# Patient Record
Sex: FEMALE | Race: WHITE | NOT HISPANIC OR LATINO | Employment: OTHER | URBAN - METROPOLITAN AREA
[De-identification: names, ages, dates, MRNs, and addresses within clinical notes are randomized per-mention and may not be internally consistent; named-entity substitution may affect disease eponyms.]

---

## 2018-02-28 ENCOUNTER — HISTORICAL (OUTPATIENT)
Dept: ADMINISTRATIVE | Facility: HOSPITAL | Age: 73
End: 2018-02-28

## 2018-02-28 ENCOUNTER — HISTORICAL (OUTPATIENT)
Dept: LAB | Facility: HOSPITAL | Age: 73
End: 2018-02-28

## 2018-02-28 LAB
ABS NEUT (OLG): 5.66 X10(3)/MCL (ref 2.1–9.2)
ALBUMIN SERPL-MCNC: 4.3 GM/DL (ref 3.4–5)
ALBUMIN/GLOB SERPL: 1.3 RATIO (ref 1.1–2)
ALP SERPL-CCNC: 64 UNIT/L (ref 38–126)
ALT SERPL-CCNC: 18 UNIT/L (ref 12–78)
APPEARANCE, UA: CLEAR
APTT PPP: 28.9 SECOND(S) (ref 24.8–36.9)
AST SERPL-CCNC: 13 UNIT/L (ref 15–37)
BACTERIA SPEC CULT: ABNORMAL /HPF
BASOPHILS # BLD AUTO: 0.1 X10(3)/MCL (ref 0–0.2)
BASOPHILS NFR BLD AUTO: 1 %
BILIRUB SERPL-MCNC: 0.6 MG/DL (ref 0.2–1)
BILIRUB UR QL STRIP: NEGATIVE
BILIRUBIN DIRECT+TOT PNL SERPL-MCNC: 0.2 MG/DL (ref 0–0.5)
BILIRUBIN DIRECT+TOT PNL SERPL-MCNC: 0.4 MG/DL (ref 0–0.8)
BUN SERPL-MCNC: 9 MG/DL (ref 7–18)
CALCIUM SERPL-MCNC: 9.6 MG/DL (ref 8.5–10.1)
CHLORIDE SERPL-SCNC: 98 MMOL/L (ref 98–107)
CO2 SERPL-SCNC: 33 MMOL/L (ref 21–32)
COLOR UR: YELLOW
CREAT SERPL-MCNC: 0.58 MG/DL (ref 0.55–1.02)
EOSINOPHIL # BLD AUTO: 0.2 X10(3)/MCL (ref 0–0.9)
EOSINOPHIL NFR BLD AUTO: 2 %
ERYTHROCYTE [DISTWIDTH] IN BLOOD BY AUTOMATED COUNT: 12.2 % (ref 11.5–17)
GLOBULIN SER-MCNC: 3.3 GM/DL (ref 2.4–3.5)
GLUCOSE (UA): NEGATIVE
GLUCOSE SERPL-MCNC: 92 MG/DL (ref 74–106)
HCT VFR BLD AUTO: 41.8 % (ref 37–47)
HGB BLD-MCNC: 14.2 GM/DL (ref 12–16)
HGB UR QL STRIP: NEGATIVE
INR PPP: 0.91 (ref 0–1.27)
KETONES UR QL STRIP: NEGATIVE
LEUKOCYTE ESTERASE UR QL STRIP: NEGATIVE
LYMPHOCYTES # BLD AUTO: 2.1 X10(3)/MCL (ref 0.6–4.6)
LYMPHOCYTES NFR BLD AUTO: 23 %
MCH RBC QN AUTO: 32.3 PG (ref 27–31)
MCHC RBC AUTO-ENTMCNC: 34 GM/DL (ref 33–36)
MCV RBC AUTO: 95.2 FL (ref 80–94)
MONOCYTES # BLD AUTO: 1.2 X10(3)/MCL (ref 0.1–1.3)
MONOCYTES NFR BLD AUTO: 13 %
MUCOUS THREADS URNS QL MICRO: ABNORMAL
NEUTROPHILS # BLD AUTO: 5.66 X10(3)/MCL (ref 1.4–7.9)
NEUTROPHILS NFR BLD AUTO: 61 %
NITRITE UR QL STRIP: NEGATIVE
PH UR STRIP: 6.5 [PH] (ref 5–9)
PLATELET # BLD AUTO: 321 X10(3)/MCL (ref 130–400)
PMV BLD AUTO: 8.4 FL (ref 9.4–12.4)
POTASSIUM SERPL-SCNC: 3.8 MMOL/L (ref 3.5–5.1)
PROT SERPL-MCNC: 7.6 GM/DL (ref 6.4–8.2)
PROT UR QL STRIP: NEGATIVE
PROTHROMBIN TIME: 12.5 SECOND(S) (ref 12.2–14.7)
RBC # BLD AUTO: 4.39 X10(6)/MCL (ref 4.2–5.4)
RBC #/AREA URNS HPF: ABNORMAL /[HPF]
SODIUM SERPL-SCNC: 136 MMOL/L (ref 136–145)
SP GR UR STRIP: 1.02 (ref 1–1.03)
SQUAMOUS EPITHELIAL, UA: ABNORMAL
TRANSFERRIN SERPL-MCNC: 264 MG/DL (ref 200–360)
UA WBC MAN: ABNORMAL
UROBILINOGEN UR STRIP-ACNC: 0.2
WBC # SPEC AUTO: 9.3 X10(3)/MCL (ref 4.5–11.5)

## 2018-03-02 LAB — FINAL CULTURE: NORMAL

## 2018-04-10 ENCOUNTER — HISTORICAL (OUTPATIENT)
Dept: ADMINISTRATIVE | Facility: HOSPITAL | Age: 73
End: 2018-04-10

## 2019-01-02 ENCOUNTER — HISTORICAL (OUTPATIENT)
Dept: ADMINISTRATIVE | Facility: HOSPITAL | Age: 74
End: 2019-01-02

## 2019-01-02 ENCOUNTER — HISTORICAL (OUTPATIENT)
Dept: LAB | Facility: HOSPITAL | Age: 74
End: 2019-01-02

## 2019-01-02 LAB
ABS NEUT (OLG): 5.38 X10(3)/MCL (ref 2.1–9.2)
BASOPHILS # BLD AUTO: 0.06 X10(3)/MCL (ref 0–0.2)
BASOPHILS NFR BLD AUTO: 0.7 % (ref 0–1)
CRP SERPL-MCNC: <0.3 MG/DL
EOSINOPHIL # BLD AUTO: 0.22 X10(3)/MCL (ref 0–0.9)
EOSINOPHIL NFR BLD AUTO: 2.5 % (ref 0–6.4)
ERYTHROCYTE [DISTWIDTH] IN BLOOD BY AUTOMATED COUNT: 12.4 % (ref 11.5–17)
ERYTHROCYTE [SEDIMENTATION RATE] IN BLOOD: 8 MM/HR (ref 0–30)
HCT VFR BLD AUTO: 40.2 % (ref 37–47)
HGB BLD-MCNC: 13.5 GM/DL (ref 12–16)
IMM GRANULOCYTES # BLD AUTO: 0.03 10*3/UL (ref 0–0.02)
IMM GRANULOCYTES NFR BLD AUTO: 0.3 % (ref 0–0.43)
LYMPHOCYTES # BLD AUTO: 2.05 X10(3)/MCL (ref 0.6–4.6)
LYMPHOCYTES NFR BLD AUTO: 23.4 % (ref 16–44)
MCH RBC QN AUTO: 31.4 PG (ref 27–31)
MCHC RBC AUTO-ENTMCNC: 33.6 GM/DL (ref 33–36)
MCV RBC AUTO: 93.5 FL (ref 80–94)
MONOCYTES # BLD AUTO: 1.01 X10(3)/MCL (ref 0.1–1.3)
MONOCYTES NFR BLD AUTO: 11.5 % (ref 4–12.1)
NEUTROPHILS # BLD AUTO: 5.38 X10(3)/MCL (ref 2.1–9.2)
NEUTROPHILS NFR BLD AUTO: 61.6 % (ref 43–73)
NRBC BLD AUTO-RTO: 0 % (ref 0–0.2)
PLATELET # BLD AUTO: 260 X10(3)/MCL (ref 130–400)
PMV BLD AUTO: 9 FL (ref 7.4–10.4)
RBC # BLD AUTO: 4.3 X10(6)/MCL (ref 4.2–5.4)
WBC # SPEC AUTO: 8.8 X10(3)/MCL (ref 4.5–11.5)

## 2019-01-15 ENCOUNTER — HISTORICAL (OUTPATIENT)
Dept: RADIOLOGY | Facility: HOSPITAL | Age: 74
End: 2019-01-15

## 2019-10-08 ENCOUNTER — HISTORICAL (OUTPATIENT)
Dept: ADMINISTRATIVE | Facility: HOSPITAL | Age: 74
End: 2019-10-08

## 2020-10-23 ENCOUNTER — HISTORICAL (OUTPATIENT)
Dept: ADMINISTRATIVE | Facility: HOSPITAL | Age: 75
End: 2020-10-23

## 2020-10-26 ENCOUNTER — HISTORICAL (OUTPATIENT)
Dept: ADMINISTRATIVE | Facility: HOSPITAL | Age: 75
End: 2020-10-26

## 2020-12-08 ENCOUNTER — HISTORICAL (OUTPATIENT)
Dept: ADMINISTRATIVE | Facility: HOSPITAL | Age: 75
End: 2020-12-08

## 2021-08-12 ENCOUNTER — HISTORICAL (OUTPATIENT)
Dept: ADMINISTRATIVE | Facility: HOSPITAL | Age: 76
End: 2021-08-12

## 2022-04-11 ENCOUNTER — HISTORICAL (OUTPATIENT)
Dept: ADMINISTRATIVE | Facility: HOSPITAL | Age: 77
End: 2022-04-11

## 2022-04-25 VITALS
DIASTOLIC BLOOD PRESSURE: 74 MMHG | WEIGHT: 185.19 LBS | OXYGEN SATURATION: 98 % | BODY MASS INDEX: 32.81 KG/M2 | HEIGHT: 63 IN | SYSTOLIC BLOOD PRESSURE: 136 MMHG

## 2022-06-13 PROBLEM — I20.9 ANGINA PECTORIS, UNSPECIFIED: Status: ACTIVE | Noted: 2022-06-13

## 2022-06-13 PROBLEM — R53.83 FATIGUE: Status: ACTIVE | Noted: 2022-06-13

## 2022-06-13 PROBLEM — F41.9 ANXIETY: Status: ACTIVE | Noted: 2022-06-13

## 2022-06-13 PROCEDURE — 82607 VITAMIN B-12: CPT | Performed by: NURSE PRACTITIONER

## 2022-06-14 PROBLEM — R19.7 DIARRHEA: Status: ACTIVE | Noted: 2022-06-14

## 2022-06-15 PROBLEM — K92.1 HEMATOCHEZIA: Status: ACTIVE | Noted: 2022-06-15

## 2022-07-12 PROBLEM — R41.3 MEMORY CHANGES: Status: ACTIVE | Noted: 2022-07-12

## 2022-07-12 PROBLEM — G45.9 TIA (TRANSIENT ISCHEMIC ATTACK): Status: ACTIVE | Noted: 2022-07-12

## 2022-08-11 PROBLEM — S81.012A LACERATION OF LEFT KNEE: Status: ACTIVE | Noted: 2022-08-11

## 2022-08-11 PROBLEM — S81.012A LACERATION OF LEFT KNEE: Status: RESOLVED | Noted: 2022-08-11 | Resolved: 2022-08-11

## 2022-08-23 PROBLEM — I51.9 HEART DISEASE: Status: ACTIVE | Noted: 2022-08-23

## 2022-08-23 PROBLEM — M17.11 OSTEOARTHRITIS OF RIGHT KNEE: Status: ACTIVE | Noted: 2022-08-23

## 2022-08-23 PROBLEM — I10 PRIMARY HYPERTENSION: Status: ACTIVE | Noted: 2022-08-23

## 2022-08-23 PROBLEM — M48.061 SPINAL STENOSIS OF LUMBAR REGION: Status: ACTIVE | Noted: 2022-08-23

## 2022-08-23 PROBLEM — M17.12 OSTEOARTHRITIS OF LEFT KNEE: Status: ACTIVE | Noted: 2022-08-23

## 2022-08-23 PROBLEM — E66.9 OBESITY: Status: ACTIVE | Noted: 2022-08-23

## 2022-08-24 ENCOUNTER — OFFICE VISIT (OUTPATIENT)
Dept: URGENT CARE | Facility: CLINIC | Age: 77
End: 2022-08-24
Payer: MEDICARE

## 2022-08-24 ENCOUNTER — TELEPHONE (OUTPATIENT)
Dept: URGENT CARE | Facility: CLINIC | Age: 77
End: 2022-08-24

## 2022-08-24 VITALS
BODY MASS INDEX: 33.86 KG/M2 | HEART RATE: 60 BPM | WEIGHT: 184 LBS | SYSTOLIC BLOOD PRESSURE: 167 MMHG | OXYGEN SATURATION: 97 % | TEMPERATURE: 99 F | RESPIRATION RATE: 18 BRPM | DIASTOLIC BLOOD PRESSURE: 77 MMHG | HEIGHT: 62 IN

## 2022-08-24 DIAGNOSIS — M25.531 RIGHT WRIST PAIN: ICD-10-CM

## 2022-08-24 DIAGNOSIS — S52.124A CLOSED NONDISPLACED FRACTURE OF HEAD OF RIGHT RADIUS, INITIAL ENCOUNTER: Primary | ICD-10-CM

## 2022-08-24 PROCEDURE — 99213 PR OFFICE/OUTPT VISIT, EST, LEVL III, 20-29 MIN: ICD-10-PCS | Mod: ,,, | Performed by: FAMILY MEDICINE

## 2022-08-24 PROCEDURE — 99213 OFFICE O/P EST LOW 20 MIN: CPT | Mod: ,,, | Performed by: FAMILY MEDICINE

## 2022-08-24 NOTE — PROGRESS NOTES
"Subjective:       Patient ID: Cynthia Tiwari is a 77 y.o. female.    Vitals:  height is 5' 2" (1.575 m) and weight is 83.5 kg (184 lb). Her temperature is 98.6 °F (37 °C). Her blood pressure is 167/77 (abnormal) and her pulse is 60. Her respiration is 18 and oxygen saturation is 97%.     Chief Complaint: Wrist Pain (Right wrist, fell and trip over shoe trying to get to the bathroom. Stuck hand out to break fall. )    HPI:  77-year-old female present to clinic with concerns of right wrist pain.  Accidentally fell in the bathroom over the issue trying to get up.  Break the fall stack hand out.  Denies tingling numbness or weakness to the hand or fingers.  States in last 2 hours noticing increasing pain.  No head injury, no loss of consciousness.    ROS :  Constitutional : No fever, no fatigue  Neck : Negative except HPI  Respiratory : No shortness of breath, no wheezing  Cardiovascular : No chest pain  Musculoskeletal : Negative except HPI  Integumentary : No rash, no abnormal lesion  Neurological : Negative for tingling numbness and weakness   Objective:      Physical Exam    General : Alert and oriented, No apparent distress, Afebrile, appears comfortable sitting in the exam chair, clear speech and appropriate communication  Neck -: supple, Non Tender  Respiratory :Lungs are clear to auscultate, Breath sounds are equal  Cardiovascular : Normal rate, normal volume pulse bilateral  Musculoskeletal :  Right wrist and hand no visible bruising or swelling or deformity.  Right wrist tender to palpate.  Range of motion limited due to concerns of pain  Integumentary : Warm, Dry   Neurologic : Alert and Oriented X 4, sensations intact, motor intact  Assessment:       1. Closed nondisplaced fracture of head of right radius, initial encounter    2. Right wrist pain          Plan:       With history of fall, right wrist pain x-rays today  X-rays right wrist:  Concerns of nondisplaced fracture radial head distally.  Wrist splint " for now  Radiology final results will be monitored and reported.  Rest, ice, elevation.  Tylenol for pain every 6 hours as needed  Orthopedic follow-up.  Will wait on Radiology final results.  Call or return to clinic for any questions    Closed nondisplaced fracture of head of right radius, initial encounter    Right wrist pain  -     XR WRIST COMPLETE 3 VIEWS RIGHT; Future; Expected date: 08/24/2022

## 2022-08-24 NOTE — PATIENT INSTRUCTIONS
With history of fall, right wrist pain x-rays today  X-rays right wrist:  Concerns of nondisplaced fracture radial head distally.  Wrist splint for now  Radiology final results will be monitored and reported.  Rest, ice, elevation.  Tylenol for pain every 6 hours as needed  Orthopedic follow-up.  Will wait on Radiology final results.  Call or return to clinic for any questions

## 2022-08-24 NOTE — TELEPHONE ENCOUNTER
Called pt with final xray results- pt verbalized understanding       ----- Message from Sparkle Callaway MD sent at 8/24/2022  4:43 PM CDT -----  Notify patient with x-rays showing nondisplaced fracture radial styloid processes, subtle lucency at the base of 5th metacarpal.  Orthopedic referral in the chart.

## 2022-08-25 ENCOUNTER — HOSPITAL ENCOUNTER (OUTPATIENT)
Dept: WOUND CARE | Facility: HOSPITAL | Age: 77
Discharge: HOME | End: 2022-08-25
Attending: EMERGENCY MEDICINE
Payer: MEDICARE

## 2022-08-25 ENCOUNTER — TELEPHONE (OUTPATIENT)
Dept: WOUND CARE | Facility: HOSPITAL | Age: 77
End: 2022-08-25

## 2022-08-25 VITALS
RESPIRATION RATE: 18 BRPM | HEART RATE: 59 BPM | SYSTOLIC BLOOD PRESSURE: 174 MMHG | WEIGHT: 184 LBS | DIASTOLIC BLOOD PRESSURE: 84 MMHG | OXYGEN SATURATION: 99 % | HEIGHT: 62 IN | BODY MASS INDEX: 33.86 KG/M2 | TEMPERATURE: 99 F

## 2022-08-25 DIAGNOSIS — S80.12XA CONTUSION OF LEFT LOWER EXTREMITY, INITIAL ENCOUNTER: ICD-10-CM

## 2022-08-25 DIAGNOSIS — I10 ESSENTIAL HYPERTENSION, BENIGN: ICD-10-CM

## 2022-08-25 DIAGNOSIS — M17.11 PRIMARY OSTEOARTHRITIS OF RIGHT KNEE: ICD-10-CM

## 2022-08-25 DIAGNOSIS — E66.9 CLASS 1 OBESITY WITH BODY MASS INDEX (BMI) OF 33.0 TO 33.9 IN ADULT, UNSPECIFIED OBESITY TYPE, UNSPECIFIED WHETHER SERIOUS COMORBIDITY PRESENT: ICD-10-CM

## 2022-08-25 DIAGNOSIS — F41.9 ANXIETY: ICD-10-CM

## 2022-08-25 DIAGNOSIS — I25.84 CORONARY ARTERY CALCIFICATION OF NATIVE ARTERY: ICD-10-CM

## 2022-08-25 DIAGNOSIS — Z79.01 ANTICOAGULATED: ICD-10-CM

## 2022-08-25 DIAGNOSIS — I25.10 CORONARY ARTERY CALCIFICATION OF NATIVE ARTERY: ICD-10-CM

## 2022-08-25 DIAGNOSIS — I83.93 SPIDER VEINS OF BOTH LOWER EXTREMITIES: ICD-10-CM

## 2022-08-25 DIAGNOSIS — S81.002A OPEN WOUND OF LEFT KNEE, INITIAL ENCOUNTER: ICD-10-CM

## 2022-08-25 DIAGNOSIS — M17.12 PRIMARY OSTEOARTHRITIS OF LEFT KNEE: ICD-10-CM

## 2022-08-25 DIAGNOSIS — M48.061 SPINAL STENOSIS OF LUMBAR REGION WITHOUT NEUROGENIC CLAUDICATION: ICD-10-CM

## 2022-08-25 DIAGNOSIS — E78.5 HYPERLIPIDEMIA, UNSPECIFIED HYPERLIPIDEMIA TYPE: ICD-10-CM

## 2022-08-25 PROCEDURE — 27000999 HC MEDICAL RECORD PHOTO DOCUMENTATION

## 2022-08-25 PROCEDURE — 11042 DBRDMT SUBQ TIS 1ST 20SQCM/<: CPT

## 2022-08-25 NOTE — PATIENT INSTRUCTIONS
Pt seen today by: Dr. Mo      We will order supplies from [employer]      Self care instructions      Wound location: L Lateral knee        Cleanse wound with soap and water  Apply Bactrim to the wound bed  Cover with Sorbact contact layer  Cover with Alginate Silver and secure with foam border dressing   Dressings to be changed daily and as needed if soiled or not intact.       Compression with: N/A    Return visit: Thursday, September 1st, at 2:00pm (call at [phone] if you need to reschedule)      Wound may have been debrided in clinic: if so, WHAT YOU NEED TO KNOW:    Debridement is the removal of infected, damaged, or dead tissue so a wound can heal properly. Your wound may need more than one debridement. Debridement can cause bleeding, and a small amount of blood is expected.  AFTER A DEBRIDEMENT:    Keep your wound clean and dry. Do not remove the dressing unless instructed.  Follow the wound care orders provided to you or your home health care provider.  If you have pain, take over the counter pain relievers or pain medication if prescribed.  Elevate the wound and limit excessive activity to prevent bleeding and/or swelling in your wound.  If you see blood coming through the dressing, apply gauze and tape over the dressing and hold firm pressure to the wound with your hand for 5-10 minutes continuously, without peeking, to help the bleeding stop.  Contact [facility] wound care team at [phone] or go to the nearest Emergency department if:    You have a fever greater than 101 taken by mouth.  Your pain gets worse or does not go away, even after taking your regular pain medicine.  Your skin around your wound is red, hot, swollen, or draining pus.  You have bleeding that continues to come through the dressing after holding pressure for 10 minutes       Compression: You may be using a compressive type of dressings to control edema: If so, keep your compression wrap or tubigrip in place. Do not get them wet,  they should feel snug. If they feel tight, or cause pain in any way,  elevate your legs above your heart for 15 minutes. If the wraps still cause pain or you can not tolerate compression,  please remove and notify the clinic or your home health.     Nutrition:  The current daily value (%DV) for protein is 50 grams per day and is meant as a general goal for most people. Further increasing your dietary protein intake is very important for wound healing. Typically one needs over 100g of protein per day to help with wound healing needs.  If you are a dialysis patient or have problems with your kidneys, talk to your Nephrologist about how much protein you can take in with your condition.  Examples of high protein items that can be added to your diet include: eggs, chicken, red meats, almonds, cottage cheese, Greek yogurt, beans, and peanut butter.  Fortified protein bars, shakes and drinks can add 15-30 additional grams of protein per serving.   Also add:   1 daily general multivitamin   Ollie : 1 packet twice daily   Vitamin C : 500mg twice daily   Zinc 220 mg daily  Vit D : once daily    Offloading   Offload your wound. This means to reduce pressure on and around the wound that reduces blood flow to the wound and prevents healing. Your wound care team will discuss specific ways for you to offload your specific wound. Common offloading strategies include:    Turn or reposition every 2 hours or sooner  Use pillows, wedges, ROHO wheelchair cushions or other special devices like boots and shoes to lift the wound off of hard surfaces  Alternating Low Air-loss (ALAL) mattress may be ordered  Padded dressings can reduce wound pressure        Call our [facility] wound clinic for questions/concerns a [phone] .

## 2022-08-25 NOTE — PROGRESS NOTES
Subjective:       Patient ID: Cynthia Tiwari is a 77 y.o. female.    Chief Complaint: No chief complaint on file.    77-year-old white female referred to this Wound Care Clinic to treat a wound on her left lower extremity/knee area.    Patient reports that she hit her left knee/proximal leg on a furniture piece in her home. She was evaluated by Dr Dheeraj Villatoro on 8/11/22 where a wound was noted on left lower knee area with rolled/redunant skin. Rx topical mupirocin given.She followed up in the office on 8/23/22 with ongoing c/o pain requesting an xray (neg). Wound seemed worse so rx oral bactrim (x 10 days) and referred to the wound care clinic. She comes in today on 8/25/22 for her first wound clinic visit. Of note, she had a fall on 8/24/22 which caused a right distal radius subtle styloid fracture for which she was seen at urgent care .       Past Medical History: lumbar stenosis, osteoarthritis, hypertension, dyslipidemia, obesity, prior TIA, anxiety , CAD, chronic anticoagulation     Past Surgical History:  Back surgery, cataracts, cholecystectomy, hip surgery        SH; nonsmoker; no etoh; lives in [place]      Review of Systems   Constitutional: Negative for appetite change, chills, diaphoresis, fatigue and fever.   HENT: Negative.    Respiratory: Negative.    Cardiovascular: Negative.    Gastrointestinal: Negative.  Negative for abdominal pain, constipation, diarrhea and nausea.   Musculoskeletal: Positive for arthralgias, back pain and joint swelling.   Skin: Positive for wound.   Neurological: Negative.    Hematological: Bruises/bleeds easily.   Psychiatric/Behavioral: The patient is nervous/anxious.          Objective:      Vitals:    08/25/22 1341   BP: (!) 174/84   Pulse: (!) 59   Resp: 18   Temp: 98.5 °F (36.9 °C)     @poctglucose@  No results for input(s): POCTGLUCOSE in the last 24 hours.  Physical Exam  Constitutional:       General: She is not in acute distress.     Appearance: She is obese. She is  not ill-appearing, toxic-appearing or diaphoretic.   HENT:      Head: Normocephalic and atraumatic.      Mouth/Throat:      Pharynx: Oropharynx is clear.   Eyes:      Conjunctiva/sclera: Conjunctivae normal.   Pulmonary:      Effort: Pulmonary effort is normal.   Musculoskeletal:        Legs:       Comments: Both legs are large.  She has a multitude of spider veins mixed with a few varicose veins as well.  No hemosiderin staining   Skin:     General: Skin is warm and dry.      Capillary Refill: Capillary refill takes less than 2 seconds.   Neurological:      General: No focal deficit present.      Mental Status: She is alert and oriented to person, place, and time.   Psychiatric:         Mood and Affect: Mood normal.              Wound 08/25/22 1345 Traumatic Left anterior;lower;proximal Leg #1 (Active)   08/25/22 1345    Pre-existing: Yes   Primary Wound Type: Traumatic   Side: Left   Orientation: anterior;lower;proximal   Location: Leg   Wound Number: #1   Ankle-Brachial Index: Dagoberto done 8/25/22, r dp=0.92, r pt=0.87/ l dp=0.83,l pt=0.84   Pulses: non-palpable, +doppler monophasic   Removal Indication and Assessment:    Wound Outcome:    (Retired) Wound Type:    (Retired) Wound Length (cm):    (Retired) Wound Width (cm):    (Retired) Depth (cm):    Wound Description (Comments):    Removal Indications:    Wound Image   08/25/22 1300   Wound Length (cm) 3 cm 08/25/22 1300   Wound Width (cm) 3.2 cm 08/25/22 1300   Wound Depth (cm) 0.1 cm 08/25/22 1300   Wound Volume (cm^3) 0.96 cm^3 08/25/22 1300   Wound Surface Area (cm^2) 9.6 cm^2 08/25/22 1300           Assessment:       1. Open wound of left knee, initial encounter    2. Contusion of left lower extremity, initial encounter    3. Primary osteoarthritis of right knee    4. Primary osteoarthritis of left knee    5. Spider veins of both lower extremities    6. Class 1 obesity with body mass index (BMI) of 33.0 to 33.9 in adult, unspecified obesity type, unspecified  whether serious comorbidity present    7. Anxiety    8. Spinal stenosis of lumbar region without neurogenic claudication    9. Essential hypertension, benign    10. Hyperlipidemia, unspecified hyperlipidemia type    11. Anticoagulated    12. Coronary artery calcification of native artery          Left knee wound: occurred early Aug 2022, first clinic visit 8/25/22      Lab Results   Component Value Date    WBC 9.8 06/13/2022    HGB 12.8 06/13/2022    HCT 38.4 06/13/2022    MCV 97.5 (H) 06/13/2022     06/13/2022         CMP  Sodium Level   Date Value Ref Range Status   06/13/2022 136 136 - 145 mmol/L Final     Potassium Level   Date Value Ref Range Status   06/13/2022 4.0 3.5 - 5.1 mmol/L Final     Carbon Dioxide   Date Value Ref Range Status   06/13/2022 29 21 - 32 mmol/L Final     Blood Urea Nitrogen   Date Value Ref Range Status   06/13/2022 9.0 7.0 - 18.0 mg/dL Final     Creatinine   Date Value Ref Range Status   06/13/2022 0.65 0.60 - 1.30 mg/dL Final     Calcium Level Total   Date Value Ref Range Status   06/13/2022 9.8 8.5 - 10.1 mg/dL Final     Albumin Level   Date Value Ref Range Status   06/13/2022 4.0 3.4 - 5.0 gm/dL Final     Bilirubin Total   Date Value Ref Range Status   06/13/2022 0.7 0.2 - 1.0 mg/dL Final     Alkaline Phosphatase   Date Value Ref Range Status   06/13/2022 48 38 - 126 unit/L Final     Aspartate Aminotransferase   Date Value Ref Range Status   06/13/2022 15 15 - 37 unit/L Final     Alanine Aminotransferase   Date Value Ref Range Status   06/13/2022 20 7 - 52 unit/L Final     Estimated GFR-Non    Date Value Ref Range Status   06/13/2022 >60 mls/min/1.73/m2 Final     No results found for: LABA1C, HGBA1C  Lab Results   Component Value Date    SEDRATE 8 01/02/2019     Lab Results   Component Value Date    CRP <0.3 01/02/2019       Plan:     Plan of Care:    1. Patient is a new  to this clinic being referred by PCP to treat a wound on her left knee that August.  She  comes in today on 08/25/2022 for her 1st appointment.  She is currently on Bactrim and using topical mupirocin per her PCP.  2.   Wound was assessed and it seems there is a nonviable rolled portion on the inferior lateral edge: she is agreeable for this to be excised which I did as well as debrided the wound bed.  3. Pt tolerated well  4. Wound Care Orders: dressings of mupirocin, cutimed and silver alginate dressings, change daily this week  5. Nutrition: Must have a high protein diet to support wound  healing; (if renal disease, see nephrologist for amount allowed):  this should be over 100g protein /day (if no kidney issues); Also rec MVI along with vit C, vit D, zinc and Ollie  6. Return to clinic 1 week           The time spent including preparing to see the patient, obtaining patient history and assessment, evaluation of the plan of care, patient/caregiver counseling and education, orders, documentation, coordination of care, and other professional medical management activities for today's encounter was 50 minutes.    Time spent performing procedures during today's encounter was 15 minutes.

## 2022-08-25 NOTE — PROCEDURES
"Debridement    Date/Time: 8/25/2022 1:00 PM  Performed by: Kika Mo MD  Authorized by: Kika Mo MD   Associated wounds:        Wound 08/25/22 1345 Traumatic Left anterior;lower;proximal Leg #1  Time out: Immediately prior to procedure a "time out" was called to verify the correct patient, procedure, equipment, support staff and site/side marked as required.    Consent Done?:  Yes (Written)    Preparation: Patient was prepped and draped in usual sterile fashion    Local anesthesia used?: Yes    Local anesthetic:  Lidocaine 1% with epinephrine    Wound Details:    Location:  Left knee    Type of Debridement:  Excisional       Length (cm):  3       Area (sq cm):  9.6       Width (cm):  3.2       Percent Debrided (%):  100       Depth (cm):  0.1       Total Area Debrided (sq cm):  9.6    Depth of debridement:  Subcutaneous tissue    Tissue debrided:  Dermis, Epidermis and Subcutaneous    Devitalized tissue debrided:  Biofilm, Fibrin, Slough, Necrotic/Eschar and Other    Instruments:  Forceps, Curette, Nippers and Scissors    Bleeding:  Minimal  Hemostasis Achieved: Yes    Method Used:  Pressure  Patient tolerance:  Patient tolerated the procedure well with no immediate complications      "

## 2022-09-01 ENCOUNTER — HOSPITAL ENCOUNTER (OUTPATIENT)
Dept: WOUND CARE | Facility: HOSPITAL | Age: 77
Discharge: HOME | End: 2022-09-01
Attending: EMERGENCY MEDICINE
Payer: MEDICARE

## 2022-09-01 VITALS
RESPIRATION RATE: 18 BRPM | SYSTOLIC BLOOD PRESSURE: 153 MMHG | DIASTOLIC BLOOD PRESSURE: 75 MMHG | HEIGHT: 62 IN | TEMPERATURE: 99 F | WEIGHT: 184 LBS | HEART RATE: 67 BPM | BODY MASS INDEX: 33.86 KG/M2

## 2022-09-01 DIAGNOSIS — F41.9 ANXIETY: ICD-10-CM

## 2022-09-01 DIAGNOSIS — I25.84 CORONARY ARTERY CALCIFICATION OF NATIVE ARTERY: ICD-10-CM

## 2022-09-01 DIAGNOSIS — I10 ESSENTIAL HYPERTENSION, BENIGN: ICD-10-CM

## 2022-09-01 DIAGNOSIS — S81.002A OPEN WOUND OF LEFT KNEE, INITIAL ENCOUNTER: Primary | ICD-10-CM

## 2022-09-01 DIAGNOSIS — I83.93 SPIDER VEINS OF BOTH LOWER EXTREMITIES: ICD-10-CM

## 2022-09-01 DIAGNOSIS — M17.11 PRIMARY OSTEOARTHRITIS OF RIGHT KNEE: ICD-10-CM

## 2022-09-01 DIAGNOSIS — E66.9 CLASS 1 OBESITY WITH BODY MASS INDEX (BMI) OF 33.0 TO 33.9 IN ADULT, UNSPECIFIED OBESITY TYPE, UNSPECIFIED WHETHER SERIOUS COMORBIDITY PRESENT: ICD-10-CM

## 2022-09-01 DIAGNOSIS — S80.12XA CONTUSION OF LEFT LOWER EXTREMITY, INITIAL ENCOUNTER: ICD-10-CM

## 2022-09-01 DIAGNOSIS — Z79.01 ANTICOAGULATED: ICD-10-CM

## 2022-09-01 DIAGNOSIS — M17.12 PRIMARY OSTEOARTHRITIS OF LEFT KNEE: ICD-10-CM

## 2022-09-01 DIAGNOSIS — I25.10 CORONARY ARTERY CALCIFICATION OF NATIVE ARTERY: ICD-10-CM

## 2022-09-01 DIAGNOSIS — E78.5 HYPERLIPIDEMIA, UNSPECIFIED HYPERLIPIDEMIA TYPE: ICD-10-CM

## 2022-09-01 DIAGNOSIS — M48.061 SPINAL STENOSIS OF LUMBAR REGION WITHOUT NEUROGENIC CLAUDICATION: ICD-10-CM

## 2022-09-01 PROCEDURE — 11042 DBRDMT SUBQ TIS 1ST 20SQCM/<: CPT

## 2022-09-01 PROCEDURE — 27000999 HC MEDICAL RECORD PHOTO DOCUMENTATION

## 2022-09-01 NOTE — PROCEDURES
"Debridement    Date/Time: 9/1/2022 2:00 PM  Performed by: Kika Mo MD  Authorized by: Kika Mo MD   Associated wounds:        (Retired) Wound 08/25/22 1345 Traumatic Left anterior;lower;proximal Leg #1  Time out: Immediately prior to procedure a "time out" was called to verify the correct patient, procedure, equipment, support staff and site/side marked as required.    Consent Done?:  Yes (Written)    Preparation: Patient was prepped and draped in usual sterile fashion    Local anesthesia used?: Yes    Local anesthetic:  Topical anesthetic    Wound Details:    Location:  Left leg (proximal near knee)    Type of Debridement:  Excisional       Length (cm):  1.3       Area (sq cm):  2       Width (cm):  1.5       Percent Debrided (%):  100       Depth (cm):  0.1       Total Area Debrided (sq cm):  2    Depth of debridement:  Subcutaneous tissue    Tissue debrided:  Dermis, Epidermis and Subcutaneous    Devitalized tissue debrided:  Biofilm and Slough    Instruments:  Curette    Bleeding:  Minimal  Hemostasis Achieved: Yes    Method Used:  Pressure  Patient tolerance:  Patient tolerated the procedure well with no immediate complications  "

## 2022-09-01 NOTE — PROGRESS NOTES
Subjective:       Patient ID: Cynthia Tiwari is a 77 y.o. female.    Chief Complaint: No chief complaint on file.    77-year-old WF referred to this Wound Care Clinic to treat a wound on her left lower extremity/knee area.    Patient reports that she hit her left knee/proximal leg on a furniture piece in her home. She was evaluated by Dr Dheeraj Villatoro on 8/11/22 where a wound was noted on left lower knee area with rolled/redunant skin. Rx topical mupirocin given.She followed up in the office on 8/23/22 with ongoing c/o pain requesting an xray (neg). Wound seemed worse so rx oral bactrim (x 10 days) and referred to the wound care clinic. She came in for first visit on 8/25/22: wound noted which had rolled nonviable skin inferior edge; All debrided and advised of silver alginate dressings. She says it is much better  (Of note , RUE in a forearm/wrist cast  now...she fell on  8/24/22 which caused a right distal radius subtle styloid fracture; she is left handed though)      Review of Systems   Constitutional:  Negative for appetite change, chills, diaphoresis, fatigue and fever.   HENT: Negative.     Respiratory: Negative.     Cardiovascular: Negative.    Gastrointestinal: Negative.  Negative for abdominal pain, constipation, diarrhea and nausea.   Musculoskeletal:  Positive for arthralgias, back pain and joint swelling.   Skin:  Positive for wound.   Neurological: Negative.    Hematological:  Bruises/bleeds easily.   Psychiatric/Behavioral:  The patient is nervous/anxious.        Objective:      Vitals:    09/01/22 1403   BP: (!) 153/75   Pulse: 67   Resp: 18   Temp: 99.1 °F (37.3 °C)     @poctglucose@  No results for input(s): POCTGLUCOSE in the last 24 hours.  Physical Exam  Constitutional:       General: She is not in acute distress.     Appearance: She is obese. She is not ill-appearing, toxic-appearing or diaphoretic.   HENT:      Head: Normocephalic and atraumatic.      Mouth/Throat:      Pharynx: Oropharynx is clear.    Eyes:      Conjunctiva/sclera: Conjunctivae normal.   Pulmonary:      Effort: Pulmonary effort is normal.   Musculoskeletal:        Legs:       Comments: Both legs are large.  She has a multitude of spider veins mixed with a few varicose veins as well.  No hemosiderin staining    Right wrist in cast   Skin:     General: Skin is warm and dry.      Capillary Refill: Capillary refill takes less than 2 seconds.   Neurological:      General: No focal deficit present.      Mental Status: She is alert and oriented to person, place, and time.   Psychiatric:         Mood and Affect: Mood normal.            (Retired) Wound 08/25/22 1345 Traumatic Left anterior;lower;proximal Leg #1 (Active)   08/25/22 1345    Pre-existing: Yes   Primary Wound Type: Traumatic   Side: Left   Orientation: anterior;lower;proximal   Location: Leg   Wound Number: #1   Ankle-Brachial Index: Dagoberto done 8/25/22, r dp=0.92, r pt=0.87/ l dp=0.83,l pt=0.84   Pulses: non-palpable, +doppler monophasic   Removal Indication and Assessment:    Wound Outcome:    (Retired) Wound Type:    (Retired) Wound Length (cm):    (Retired) Wound Width (cm):    (Retired) Depth (cm):    Wound Description (Comments):    Removal Indications:    Wound Image   09/01/22 1409   Dressing Appearance Intact;Moist drainage 09/01/22 1409   Drainage Amount Moderate 09/01/22 1409   Drainage Characteristics/Odor Yellow;Serosanguineous 09/01/22 1409   Appearance Pink;Yellow 09/01/22 1409   Tissue loss description Full thickness 09/01/22 1409   Black (%), Wound Tissue Color 0 % 09/01/22 1409   Red (%), Wound Tissue Color 75 % 09/01/22 1409   Yellow (%), Wound Tissue Color 25 % 09/01/22 1409   Periwound Area Intact 09/01/22 1409   Wound Edges Defined 09/01/22 1409   Wound Length (cm) 1.3 cm 09/01/22 1409   Wound Width (cm) 1.5 cm 09/01/22 1409   Wound Depth (cm) 0.1 cm 09/01/22 1409   Wound Volume (cm^3) 0.195 cm^3 09/01/22 1409   Wound Surface Area (cm^2) 1.95 cm^2 09/01/22 1409   Care  Cleansed with:;Wound cleanser;Applied: 09/01/22 1409   Dressing Removed;Changed;Calcium alginate;Foam 09/01/22 1409           Assessment:       1. Open wound of left knee, initial encounter    2. Contusion of left lower extremity, initial encounter    3. Anticoagulated    4. Primary osteoarthritis of right knee    5. Primary osteoarthritis of left knee    6. Spider veins of both lower extremities    7. Class 1 obesity with body mass index (BMI) of 33.0 to 33.9 in adult, unspecified obesity type, unspecified whether serious comorbidity present    8. Anxiety    9. Spinal stenosis of lumbar region without neurogenic claudication    10. Essential hypertension, benign    11. Coronary artery calcification of native artery    12. Hyperlipidemia, unspecified hyperlipidemia type          Left knee wound: occurred early Aug 2022, first clinic visit 8/25/22      Lab Results   Component Value Date    WBC 9.8 06/13/2022    HGB 12.8 06/13/2022    HCT 38.4 06/13/2022    MCV 97.5 (H) 06/13/2022     06/13/2022         CMP  Sodium Level   Date Value Ref Range Status   06/13/2022 136 136 - 145 mmol/L Final     Potassium Level   Date Value Ref Range Status   06/13/2022 4.0 3.5 - 5.1 mmol/L Final     Carbon Dioxide   Date Value Ref Range Status   06/13/2022 29 21 - 32 mmol/L Final     Blood Urea Nitrogen   Date Value Ref Range Status   06/13/2022 9.0 7.0 - 18.0 mg/dL Final     Creatinine   Date Value Ref Range Status   06/13/2022 0.65 0.60 - 1.30 mg/dL Final     Calcium Level Total   Date Value Ref Range Status   06/13/2022 9.8 8.5 - 10.1 mg/dL Final     Albumin Level   Date Value Ref Range Status   06/13/2022 4.0 3.4 - 5.0 gm/dL Final     Bilirubin Total   Date Value Ref Range Status   06/13/2022 0.7 0.2 - 1.0 mg/dL Final     Alkaline Phosphatase   Date Value Ref Range Status   06/13/2022 48 38 - 126 unit/L Final     Aspartate Aminotransferase   Date Value Ref Range Status   06/13/2022 15 15 - 37 unit/L Final     Alanine  Aminotransferase   Date Value Ref Range Status   06/13/2022 20 7 - 52 unit/L Final     Estimated GFR-Non    Date Value Ref Range Status   06/13/2022 >60 mls/min/1.73/m2 Final     No results found for: LABA1C, HGBA1C  Lab Results   Component Value Date    SEDRATE 8 01/02/2019     Lab Results   Component Value Date    CRP <0.3 01/02/2019       Plan:     Plan of Care:    Wound debrided   Pt tolerated well  Wound Care Orders: dressings of mupirocin, cutimed and silver alginate dressings, change daily this week  Nutrition: Must have a high protein diet to support wound  healing; (if renal disease, see nephrologist for amount allowed):  this should be over 100g protein /day (if no kidney issues); Also rec MVI along with vit C, vit D, zinc and Ollie  Return to clinic 1 week

## 2022-09-01 NOTE — PATIENT INSTRUCTIONS
Pt seen today by: Dr. Mo      We will order supplies from [employer]      Self care instructions      Wound location: L Lateral knee        Cleanse wound with soap and water  Apply Bactrim to the wound bed  Cover with Sorbact contact layer  Cover with Alginate Silver and secure with foam border dressing   Dressings to be changed daily and as needed if soiled or not intact.       Compression with: N/A    Return visit: Thursday, September 8th, at 2:00pm (call at [phone] if you need to reschedule)      Wound may have been debrided in clinic: if so, WHAT YOU NEED TO KNOW:    Debridement is the removal of infected, damaged, or dead tissue so a wound can heal properly. Your wound may need more than one debridement. Debridement can cause bleeding, and a small amount of blood is expected.  AFTER A DEBRIDEMENT:    Keep your wound clean and dry. Do not remove the dressing unless instructed.  Follow the wound care orders provided to you or your home health care provider.  If you have pain, take over the counter pain relievers or pain medication if prescribed.  Elevate the wound and limit excessive activity to prevent bleeding and/or swelling in your wound.  If you see blood coming through the dressing, apply gauze and tape over the dressing and hold firm pressure to the wound with your hand for 5-10 minutes continuously, without peeking, to help the bleeding stop.  Contact [facility] wound care team at [phone] or go to the nearest Emergency department if:    You have a fever greater than 101 taken by mouth.  Your pain gets worse or does not go away, even after taking your regular pain medicine.  Your skin around your wound is red, hot, swollen, or draining pus.  You have bleeding that continues to come through the dressing after holding pressure for 10 minutes       Compression: You may be using a compressive type of dressings to control edema: If so, keep your compression wrap or tubigrip in place. Do not get them wet,  they should feel snug. If they feel tight, or cause pain in any way,  elevate your legs above your heart for 15 minutes. If the wraps still cause pain or you can not tolerate compression,  please remove and notify the clinic or your home health.     Nutrition:  The current daily value (%DV) for protein is 50 grams per day and is meant as a general goal for most people. Further increasing your dietary protein intake is very important for wound healing. Typically one needs over 100g of protein per day to help with wound healing needs.  If you are a dialysis patient or have problems with your kidneys, talk to your Nephrologist about how much protein you can take in with your condition.  Examples of high protein items that can be added to your diet include: eggs, chicken, red meats, almonds, cottage cheese, Greek yogurt, beans, and peanut butter.  Fortified protein bars, shakes and drinks can add 15-30 additional grams of protein per serving.   Also add:   1 daily general multivitamin   Ollie : 1 packet twice daily   Vitamin C : 500mg twice daily   Zinc 220 mg daily  Vit D : once daily    Offloading   Offload your wound. This means to reduce pressure on and around the wound that reduces blood flow to the wound and prevents healing. Your wound care team will discuss specific ways for you to offload your specific wound. Common offloading strategies include:    Turn or reposition every 2 hours or sooner  Use pillows, wedges, ROHO wheelchair cushions or other special devices like boots and shoes to lift the wound off of hard surfaces  Alternating Low Air-loss (ALAL) mattress may be ordered  Padded dressings can reduce wound pressure        Call our [facility] wound clinic for questions/concerns a [phone] .

## 2022-09-08 ENCOUNTER — HOSPITAL ENCOUNTER (OUTPATIENT)
Dept: WOUND CARE | Facility: HOSPITAL | Age: 77
Discharge: HOME | End: 2022-09-08
Attending: EMERGENCY MEDICINE
Payer: MEDICARE

## 2022-09-08 VITALS
WEIGHT: 184 LBS | SYSTOLIC BLOOD PRESSURE: 150 MMHG | HEIGHT: 62 IN | BODY MASS INDEX: 33.86 KG/M2 | RESPIRATION RATE: 18 BRPM | DIASTOLIC BLOOD PRESSURE: 85 MMHG | HEART RATE: 55 BPM | TEMPERATURE: 98 F

## 2022-09-08 DIAGNOSIS — F41.9 ANXIETY: ICD-10-CM

## 2022-09-08 DIAGNOSIS — I10 ESSENTIAL HYPERTENSION, BENIGN: ICD-10-CM

## 2022-09-08 DIAGNOSIS — E78.5 HYPERLIPIDEMIA, UNSPECIFIED HYPERLIPIDEMIA TYPE: ICD-10-CM

## 2022-09-08 DIAGNOSIS — M48.061 SPINAL STENOSIS OF LUMBAR REGION WITHOUT NEUROGENIC CLAUDICATION: ICD-10-CM

## 2022-09-08 DIAGNOSIS — S81.002A OPEN WOUND OF LEFT KNEE, INITIAL ENCOUNTER: Primary | ICD-10-CM

## 2022-09-08 DIAGNOSIS — Z79.01 ANTICOAGULATED: ICD-10-CM

## 2022-09-08 DIAGNOSIS — I83.93 SPIDER VEINS OF BOTH LOWER EXTREMITIES: ICD-10-CM

## 2022-09-08 DIAGNOSIS — M17.11 PRIMARY OSTEOARTHRITIS OF RIGHT KNEE: ICD-10-CM

## 2022-09-08 DIAGNOSIS — S80.12XA CONTUSION OF LEFT LOWER EXTREMITY, INITIAL ENCOUNTER: ICD-10-CM

## 2022-09-08 PROCEDURE — 27000999 HC MEDICAL RECORD PHOTO DOCUMENTATION

## 2022-09-08 PROCEDURE — 11042 DBRDMT SUBQ TIS 1ST 20SQCM/<: CPT

## 2022-09-08 NOTE — PROCEDURES
"Debridement    Date/Time: 9/8/2022 1:46 PM  Performed by: Kika Mo MD  Authorized by: Kika Mo MD     Time out: Immediately prior to procedure a "time out" was called to verify the correct patient, procedure, equipment, support staff and site/side marked as required.    Consent Done?:  Yes (Written)    Preparation: Patient was prepped and draped in usual sterile fashion    Local anesthesia used?: Yes    Local anesthetic:  Topical anesthetic    Wound Details:    Location:  Left knee    Type of Debridement:  Excisional       Length (cm):  0.8       Area (sq cm):  0.6       Width (cm):  0.7       Percent Debrided (%):  100       Depth (cm):  0.1       Total Area Debrided (sq cm):  0.6    Depth of debridement:  Subcutaneous tissue    Tissue debrided:  Epidermis, Dermis and Subcutaneous    Devitalized tissue debrided:  Biofilm and Slough    Instruments:  Curette    Bleeding:  Minimal  Hemostasis Achieved: Yes    Method Used:  Pressure  Patient tolerance:  Patient tolerated the procedure well with no immediate complications  "

## 2022-09-08 NOTE — PATIENT INSTRUCTIONS
Pt seen today by: Dr. Mo      We will order supplies from [employer]      Self care instructions      Wound location: L Lateral knee        Cleanse wound with soap and water  Apply Bactrim to the wound bed  Cover with Promogran and secure with foam border dressing   Dressings to be changed every other day and as needed if soiled or not intact.       Compression with: N/A    Return visit: Thursday, September 15th, at 2:00pm (call at [phone] if you need to reschedule)      Wound may have been debrided in clinic: if so, WHAT YOU NEED TO KNOW:    Debridement is the removal of infected, damaged, or dead tissue so a wound can heal properly. Your wound may need more than one debridement. Debridement can cause bleeding, and a small amount of blood is expected.  AFTER A DEBRIDEMENT:    Keep your wound clean and dry. Do not remove the dressing unless instructed.  Follow the wound care orders provided to you or your home health care provider.  If you have pain, take over the counter pain relievers or pain medication if prescribed.  Elevate the wound and limit excessive activity to prevent bleeding and/or swelling in your wound.  If you see blood coming through the dressing, apply gauze and tape over the dressing and hold firm pressure to the wound with your hand for 5-10 minutes continuously, without peeking, to help the bleeding stop.  Contact [facility] wound care team at [phone] or go to the nearest Emergency department if:    You have a fever greater than 101 taken by mouth.  Your pain gets worse or does not go away, even after taking your regular pain medicine.  Your skin around your wound is red, hot, swollen, or draining pus.  You have bleeding that continues to come through the dressing after holding pressure for 10 minutes       Compression: You may be using a compressive type of dressings to control edema: If so, keep your compression wrap or tubigrip in place. Do not get them wet, they should feel snug. If they  feel tight, or cause pain in any way,  elevate your legs above your heart for 15 minutes. If the wraps still cause pain or you can not tolerate compression,  please remove and notify the clinic or your home health.     Nutrition:  The current daily value (%DV) for protein is 50 grams per day and is meant as a general goal for most people. Further increasing your dietary protein intake is very important for wound healing. Typically one needs over 100g of protein per day to help with wound healing needs.  If you are a dialysis patient or have problems with your kidneys, talk to your Nephrologist about how much protein you can take in with your condition.  Examples of high protein items that can be added to your diet include: eggs, chicken, red meats, almonds, cottage cheese, Greek yogurt, beans, and peanut butter.  Fortified protein bars, shakes and drinks can add 15-30 additional grams of protein per serving.   Also add:   1 daily general multivitamin   Ollie : 1 packet twice daily   Vitamin C : 500mg twice daily   Zinc 220 mg daily  Vit D : once daily    Offloading   Offload your wound. This means to reduce pressure on and around the wound that reduces blood flow to the wound and prevents healing. Your wound care team will discuss specific ways for you to offload your specific wound. Common offloading strategies include:    Turn or reposition every 2 hours or sooner  Use pillows, wedges, ROHO wheelchair cushions or other special devices like boots and shoes to lift the wound off of hard surfaces  Alternating Low Air-loss (ALAL) mattress may be ordered  Padded dressings can reduce wound pressure        Call our [facility] wound clinic for questions/concerns a [phone] .

## 2022-09-08 NOTE — PROGRESS NOTES
Subjective:       Patient ID: Cynthia Tiwari is a 77 y.o. female.    Chief Complaint: Non-healing Wound    77-year-old WF referred to this Wound Care Clinic to treat a wound on her left lower extremity/knee area.    Patient reports that she hit her left knee/proximal leg on a furniture piece in her home. She was evaluated by Dr Dheeraj Villatoro on 8/11/22 where a wound was noted on left lower knee area with rolled/redunant skin. Rx topical mupirocin given.She followed up in the office on 8/23/22 with ongoing c/o pain requesting an xray (neg). Wound seemed worse so rx oral bactrim (x 10 days) and referred to the wound care clinic. She came in for first visit on 8/25/22: wound noted which had rolled nonviable skin inferior edge; All debrided and advised of silver alginate dressings. It has improved since then.      Review of Systems   Constitutional:  Negative for appetite change, chills, diaphoresis, fatigue and fever.   HENT: Negative.     Respiratory: Negative.     Cardiovascular: Negative.    Gastrointestinal: Negative.  Negative for abdominal pain, constipation, diarrhea and nausea.   Musculoskeletal:  Positive for arthralgias, back pain and joint swelling.   Skin:  Positive for wound.   Neurological: Negative.    Hematological:  Bruises/bleeds easily.   Psychiatric/Behavioral:  The patient is nervous/anxious.        Objective:      Vitals:    09/08/22 1352   BP: (!) 150/85   Pulse: (!) 55   Resp: 18   Temp: 98.2 °F (36.8 °C)     @poctglucose@  No results for input(s): POCTGLUCOSE in the last 24 hours.  Physical Exam  Constitutional:       General: She is not in acute distress.     Appearance: She is obese. She is not ill-appearing, toxic-appearing or diaphoretic.   HENT:      Head: Normocephalic and atraumatic.      Mouth/Throat:      Pharynx: Oropharynx is clear.   Eyes:      Conjunctiva/sclera: Conjunctivae normal.   Pulmonary:      Effort: Pulmonary effort is normal.   Musculoskeletal:        Legs:       Comments:  Both legs are large.  She has a multitude of spider veins mixed with a few varicose veins as well.  No hemosiderin staining    Right wrist in cast   Skin:     General: Skin is warm and dry.      Capillary Refill: Capillary refill takes less than 2 seconds.   Neurological:      General: No focal deficit present.      Mental Status: She is alert and oriented to person, place, and time.   Psychiatric:         Mood and Affect: Mood normal.            (Retired) Wound 08/25/22 1345 Traumatic Left anterior;lower;proximal Leg #1 (Active)   08/25/22 1345    Pre-existing: Yes   Primary Wound Type: Traumatic   Side: Left   Orientation: anterior;lower;proximal   Location: Leg   Wound Number: #1   Ankle-Brachial Index: Dagoberto done 8/25/22, r dp=0.92, r pt=0.87/ l dp=0.83,l pt=0.84   Pulses: non-palpable, +doppler monophasic   Removal Indication and Assessment:    Wound Outcome:    (Retired) Wound Type:    (Retired) Wound Length (cm):    (Retired) Wound Width (cm):    (Retired) Depth (cm):    Wound Description (Comments):    Removal Indications:    Wound Image   09/08/22 1356   Dressing Appearance Intact;Dry;Clean 09/08/22 1356   Drainage Amount Small 09/08/22 1356   Drainage Characteristics/Odor Yellow;Serosanguineous 09/08/22 1356   Appearance Pink 09/08/22 1356   Tissue loss description Full thickness 09/08/22 1356   Black (%), Wound Tissue Color 0 % 09/08/22 1356   Red (%), Wound Tissue Color 90 % 09/08/22 1356   Yellow (%), Wound Tissue Color 10 % 09/08/22 1356   Periwound Area Intact 09/08/22 1356   Wound Edges Defined 09/08/22 1356   Wound Length (cm) 0.8 cm 09/08/22 1356   Wound Width (cm) 0.7 cm 09/08/22 1356   Wound Depth (cm) 0.1 cm 09/08/22 1356   Wound Volume (cm^3) 0.056 cm^3 09/08/22 1356   Wound Surface Area (cm^2) 0.56 cm^2 09/08/22 1356   Care Cleansed with:;Wound cleanser;Applied: 09/08/22 1356   Dressing Removed;Changed;Collagen;Silver;Foam 09/08/22 1356           Assessment:       1. Open wound of left knee,  initial encounter    2. Contusion of left lower extremity, initial encounter    3. Anticoagulated    4. Primary osteoarthritis of right knee    5. Spider veins of both lower extremities    6. Spinal stenosis of lumbar region without neurogenic claudication    7. Essential hypertension, benign    8. Hyperlipidemia, unspecified hyperlipidemia type    9. Anxiety        Plan:     Plan of Care:    Wound debrided   It is getting smaller  Pt tolerated well  Wound Care Orders: dressings of mupirocin, but stop ilver alginate and begin promogran dressings,   Nutrition: Must have a high protein diet to support wound  healing; (if renal disease, see nephrologist for amount allowed):  this should be over 100g protein /day (if no kidney issues); Also rec MVI along with vit C, vit D, zinc and Ollie  Return to clinic 1 week

## 2022-09-15 ENCOUNTER — HOSPITAL ENCOUNTER (OUTPATIENT)
Dept: WOUND CARE | Facility: HOSPITAL | Age: 77
Discharge: HOME | End: 2022-09-15
Attending: EMERGENCY MEDICINE
Payer: MEDICARE

## 2022-09-15 VITALS
BODY MASS INDEX: 33.86 KG/M2 | HEIGHT: 62 IN | DIASTOLIC BLOOD PRESSURE: 75 MMHG | HEART RATE: 56 BPM | WEIGHT: 184 LBS | TEMPERATURE: 99 F | SYSTOLIC BLOOD PRESSURE: 151 MMHG | RESPIRATION RATE: 20 BRPM

## 2022-09-15 DIAGNOSIS — M48.061 SPINAL STENOSIS OF LUMBAR REGION WITHOUT NEUROGENIC CLAUDICATION: ICD-10-CM

## 2022-09-15 DIAGNOSIS — E66.9 CLASS 1 OBESITY WITH BODY MASS INDEX (BMI) OF 33.0 TO 33.9 IN ADULT, UNSPECIFIED OBESITY TYPE, UNSPECIFIED WHETHER SERIOUS COMORBIDITY PRESENT: ICD-10-CM

## 2022-09-15 DIAGNOSIS — E78.5 HYPERLIPIDEMIA, UNSPECIFIED HYPERLIPIDEMIA TYPE: ICD-10-CM

## 2022-09-15 DIAGNOSIS — M17.12 PRIMARY OSTEOARTHRITIS OF LEFT KNEE: ICD-10-CM

## 2022-09-15 DIAGNOSIS — S81.002A OPEN WOUND OF LEFT KNEE, INITIAL ENCOUNTER: Primary | ICD-10-CM

## 2022-09-15 DIAGNOSIS — I25.10 CORONARY ARTERY CALCIFICATION OF NATIVE ARTERY: ICD-10-CM

## 2022-09-15 DIAGNOSIS — I83.93 SPIDER VEINS OF BOTH LOWER EXTREMITIES: ICD-10-CM

## 2022-09-15 DIAGNOSIS — I25.84 CORONARY ARTERY CALCIFICATION OF NATIVE ARTERY: ICD-10-CM

## 2022-09-15 DIAGNOSIS — Z79.01 ANTICOAGULATED: ICD-10-CM

## 2022-09-15 DIAGNOSIS — M17.11 PRIMARY OSTEOARTHRITIS OF RIGHT KNEE: ICD-10-CM

## 2022-09-15 DIAGNOSIS — S80.12XA CONTUSION OF LEFT LOWER EXTREMITY, INITIAL ENCOUNTER: ICD-10-CM

## 2022-09-15 DIAGNOSIS — I10 ESSENTIAL HYPERTENSION, BENIGN: ICD-10-CM

## 2022-09-15 DIAGNOSIS — F41.9 ANXIETY: ICD-10-CM

## 2022-09-15 PROCEDURE — 99212 OFFICE O/P EST SF 10 MIN: CPT

## 2022-09-15 PROCEDURE — 27000999 HC MEDICAL RECORD PHOTO DOCUMENTATION

## 2022-09-15 NOTE — PROGRESS NOTES
Subjective:       Patient ID: Cynthia Tiawri is a 77 y.o. female.    Chief Complaint: open wound left knee    77-year-old WF referred to this Wound Care Clinic to treat a wound on her left lower extremity/knee area.    Patient reports that she hit her left knee/proximal leg on a furniture piece in her home. She was evaluated by Dr Dheeraj Villatoro on 8/11/22 where a wound was noted on left lower knee area with rolled/redunant skin. Rx topical mupirocin given.She followed up in their office on 8/23/22 with ongoing c/o pain requesting an xray (neg). Wound seemed worse so rx oral bactrim (x 10 days) and referred to the wound care clinic.   She came in for first visit on 8/25/22: wound noted which had rolled nonviable skin inferior edge; All debrided and advised of silver alginate dressings. It has improved since then and is healing. We began promogran collagen dressings last week. Appears closed today      Review of Systems   Constitutional:  Negative for appetite change, chills, diaphoresis, fatigue and fever.   HENT: Negative.     Respiratory: Negative.     Cardiovascular: Negative.    Gastrointestinal: Negative.  Negative for abdominal pain, constipation, diarrhea and nausea.   Musculoskeletal:  Positive for arthralgias, back pain and joint swelling.   Skin:  Positive for wound.   Neurological: Negative.    Hematological:  Bruises/bleeds easily.   Psychiatric/Behavioral:  The patient is nervous/anxious.        Objective:      Vitals:    09/15/22 1407   BP: (!) 151/75   Pulse: (!) 56   Resp: 20   Temp: 99 °F (37.2 °C)     @poctglucose@  No results for input(s): POCTGLUCOSE in the last 24 hours.  Physical Exam  Constitutional:       General: She is not in acute distress.     Appearance: She is obese. She is not ill-appearing, toxic-appearing or diaphoretic.   HENT:      Head: Normocephalic and atraumatic.      Mouth/Throat:      Pharynx: Oropharynx is clear.   Eyes:      Conjunctiva/sclera: Conjunctivae normal.   Pulmonary:       Effort: Pulmonary effort is normal.   Musculoskeletal:        Legs:       Comments: Both legs are large.  She has a multitude of spider veins mixed with a few varicose veins as well.  No hemosiderin staining    Right wrist: splint now instead of cast   Skin:     General: Skin is warm and dry.      Capillary Refill: Capillary refill takes less than 2 seconds.   Neurological:      General: No focal deficit present.      Mental Status: She is alert and oriented to person, place, and time.   Psychiatric:         Mood and Affect: Mood normal.            (Retired) Wound 09/15/22 1408 Traumatic Left anterior;lower;proximal Leg #1 (Active)   09/15/22 1408    Pre-existing: Yes   Primary Wound Type: Traumatic   Side: Left   Orientation: anterior;lower;proximal   Location: Leg   Wound Number: #1   Ankle-Brachial Index: Dagoberto done 8/25/22, r dp=0.92, r pt=0.87/ l dp=0.83,l pt=0.84   Pulses: non-palpable, +doppler monophasic   Removal Indication and Assessment:    Wound Outcome:    (Retired) Wound Type:    (Retired) Wound Length (cm):    (Retired) Wound Width (cm):    (Retired) Depth (cm):    Wound Description (Comments):    Removal Indications:    Wound Image   09/15/22 1400   Appearance Epithelialization 09/15/22 1400   Wound Length (cm) 0 cm 09/15/22 1400   Wound Width (cm) 0 cm 09/15/22 1400   Wound Depth (cm) 0 cm 09/15/22 1400   Wound Volume (cm^3) 0 cm^3 09/15/22 1400   Wound Surface Area (cm^2) 0 cm^2 09/15/22 1400   Care Cleansed with:;Wound cleanser 09/15/22 1400   Dressing Bandaid 09/15/22 1400           Assessment:       1. Open wound of left knee, initial encounter    2. Contusion of left lower extremity, initial encounter    3. Anticoagulated    4. Primary osteoarthritis of right knee    5. Spider veins of both lower extremities    6. Spinal stenosis of lumbar region without neurogenic claudication    7. Essential hypertension, benign    8. Hyperlipidemia, unspecified hyperlipidemia type    9. Anxiety    10.  Primary osteoarthritis of left knee    11. Class 1 obesity with body mass index (BMI) of 33.0 to 33.9 in adult, unspecified obesity type, unspecified whether serious comorbidity present    12. Coronary artery calcification of native artery        Plan:     Plan of Care:    Wound closed  Advised to keep newly healed skin protected shirlene for next 2 weeks; keep covered  Will d/c from clinic but return if anything changes       The time spent including preparing to see the patient, obtaining patient history and assessment, evaluation of the plan of care, patient/caregiver counseling and education, orders, documentation, coordination of care, and other professional medical management activities for today's encounter was 15 minutes.

## 2022-09-15 NOTE — PATIENT INSTRUCTIONS
Pt seen today by: Dr. Mo      Self care instructions      Wound location: L Lateral knee        Cleanse wound with soap and water  Cover with a bandaid for the next week or so (skin will be very thin, don't rub too hard)       Compression with: N/A    Return visit: No return visit scheduled - (call at [phone] if you need to reschedule)      Wound may have been debrided in clinic: if so, WHAT YOU NEED TO KNOW:    Debridement is the removal of infected, damaged, or dead tissue so a wound can heal properly. Your wound may need more than one debridement. Debridement can cause bleeding, and a small amount of blood is expected.  AFTER A DEBRIDEMENT:    Keep your wound clean and dry. Do not remove the dressing unless instructed.  Follow the wound care orders provided to you or your home health care provider.  If you have pain, take over the counter pain relievers or pain medication if prescribed.  Elevate the wound and limit excessive activity to prevent bleeding and/or swelling in your wound.  If you see blood coming through the dressing, apply gauze and tape over the dressing and hold firm pressure to the wound with your hand for 5-10 minutes continuously, without peeking, to help the bleeding stop.  Contact [facility] wound care team at [phone] or go to the nearest Emergency department if:    You have a fever greater than 101 taken by mouth.  Your pain gets worse or does not go away, even after taking your regular pain medicine.  Your skin around your wound is red, hot, swollen, or draining pus.  You have bleeding that continues to come through the dressing after holding pressure for 10 minutes       Compression: You may be using a compressive type of dressings to control edema: If so, keep your compression wrap or tubigrip in place. Do not get them wet, they should feel snug. If they feel tight, or cause pain in any way,  elevate your legs above your heart for 15 minutes. If the wraps still cause pain or you  can not tolerate compression,  please remove and notify the clinic or your home health.     Nutrition:  The current daily value (%DV) for protein is 50 grams per day and is meant as a general goal for most people. Further increasing your dietary protein intake is very important for wound healing. Typically one needs over 100g of protein per day to help with wound healing needs.  If you are a dialysis patient or have problems with your kidneys, talk to your Nephrologist about how much protein you can take in with your condition.  Examples of high protein items that can be added to your diet include: eggs, chicken, red meats, almonds, cottage cheese, Greek yogurt, beans, and peanut butter.  Fortified protein bars, shakes and drinks can add 15-30 additional grams of protein per serving.   Also add:   1 daily general multivitamin   Ollie : 1 packet twice daily   Vitamin C : 500mg twice daily   Zinc 220 mg daily  Vit D : once daily    Offloading   Offload your wound. This means to reduce pressure on and around the wound that reduces blood flow to the wound and prevents healing. Your wound care team will discuss specific ways for you to offload your specific wound. Common offloading strategies include:    Turn or reposition every 2 hours or sooner  Use pillows, wedges, ROHO wheelchair cushions or other special devices like boots and shoes to lift the wound off of hard surfaces  Alternating Low Air-loss (ALAL) mattress may be ordered  Padded dressings can reduce wound pressure        Call our [facility] wound clinic for questions/concerns a [phone] .

## 2023-06-08 ENCOUNTER — HOSPITAL ENCOUNTER (EMERGENCY)
Facility: HOSPITAL | Age: 78
Discharge: HOME | End: 2023-06-08
Attending: STUDENT IN AN ORGANIZED HEALTH CARE EDUCATION/TRAINING PROGRAM
Payer: MEDICARE

## 2023-06-08 VITALS
HEART RATE: 65 BPM | DIASTOLIC BLOOD PRESSURE: 60 MMHG | OXYGEN SATURATION: 97 % | SYSTOLIC BLOOD PRESSURE: 128 MMHG | RESPIRATION RATE: 20 BRPM | TEMPERATURE: 98 F

## 2023-06-08 DIAGNOSIS — I83.899 BLEEDING FROM VARICOSE VEIN: Primary | ICD-10-CM

## 2023-06-08 LAB
ANION GAP SERPL CALC-SCNC: 19 MMOL/L (ref 8–16)
ANION GAP: 19
BUN SERPL-MCNC: 16 MG/DL
BUN SERPL-MCNC: 16 MG/DL (ref 6–30)
CA-I SERPL-MCNC: 1.18 MMOL/L
CHLORIDE SERPL-SCNC: 98 MMOL/L (ref 95–110)
CHLORIDE: 98 MMOL/L
CO2 TOTAL: 26
CREAT SERPL-MCNC: 0.8 MG/DL
CREAT SERPL-MCNC: 0.8 MG/DL (ref 0.5–1.4)
GLUCOSE SERPL-MCNC: 114 MG/DL (ref 70–110)
GLUCOSE: 114
HCT VFR BLD CALC: 38 %PCV (ref 36–54)
HCT: 38
HEMOGLOBIN: 12.9
HGB BLD-MCNC: 13 G/DL
POC IONIZED CALCIUM: 1.18 MMOL/L (ref 1.06–1.42)
POC TCO2 (MEASURED): 26 MMOL/L (ref 23–29)
POTASSIUM BLD-SCNC: 3.4 MMOL/L (ref 3.5–5.1)
POTASSIUM: 3.4 MMOL/L
SAMPLE: ABNORMAL
SODIUM BLD-SCNC: 139 MMOL/L (ref 136–145)
SODIUM: 139 MMOL/L

## 2023-06-08 PROCEDURE — 90471 IMMUNIZATION ADMIN: CPT | Performed by: STUDENT IN AN ORGANIZED HEALTH CARE EDUCATION/TRAINING PROGRAM

## 2023-06-08 PROCEDURE — 90715 TDAP VACCINE 7 YRS/> IM: CPT | Performed by: STUDENT IN AN ORGANIZED HEALTH CARE EDUCATION/TRAINING PROGRAM

## 2023-06-08 PROCEDURE — 99284 EMERGENCY DEPT VISIT MOD MDM: CPT | Mod: 25

## 2023-06-08 PROCEDURE — 82962 GLUCOSE BLOOD TEST: CPT

## 2023-06-08 PROCEDURE — 82565 ASSAY OF CREATININE: CPT | Mod: 59

## 2023-06-08 PROCEDURE — 80048 BASIC METABOLIC PNL TOTAL CA: CPT

## 2023-06-08 PROCEDURE — 63600175 PHARM REV CODE 636 W HCPCS: Performed by: STUDENT IN AN ORGANIZED HEALTH CARE EDUCATION/TRAINING PROGRAM

## 2023-06-08 RX ADMIN — TETANUS TOXOID, REDUCED DIPHTHERIA TOXOID AND ACELLULAR PERTUSSIS VACCINE, ADSORBED 0.5 ML: 5; 2.5; 8; 8; 2.5 SUSPENSION INTRAMUSCULAR at 09:06

## 2023-06-09 NOTE — DISCHARGE INSTRUCTIONS
Thanks for letting use take care of you today! It is our goal to give you courteous care and to keep you comfortable and informed. If you have any questions before you leave I will be happy to try and answer them.     Advice after your visit:  Your visit in the emergency department is NOT definitive care - please follow-up with your primary care doctor and/or specialist within 1-2 days. If you do not have a primary care physician call [phone] to schedule an appointment. Please return if you have any worsening in your condition or if you have any other concerns.    Return to the emergency department if any worsening symptoms including fever, chest pain, difficulty breathing, weakness, numbness, tingling, nausea, vomiting, inability to eat, drink or take your medication, or any other new symptoms or concerns arise.      Please signup for MyChart as noted below in your paperwork to review all labwork, imaging results, and any other incidental findings from today's visit.     If you had radiology exams like an XRAY or CT in the emergency Department the interpreation on them may be preliminary - there may be less time sensitive findings on the reports please obtain these reports within 24 hours from the hospital or by using your out on your mobile phone to access records.  Bring these to your primary care doctor and/or specialist for further review of incidental findings.    Please review any LAB WORK from your visit today with your primary care physician.    If you were prescribed OPIATE PAIN MEDICATION - please understand of these medications can be addictive, you may fill less of the prescription was written for, you do not have to take the full prescription.  You may discard what you do not use.  Please seek help if you feel you are having problems with addiction.  Do not drive or operate heavy machinery if you are taking sedating medications.  Do not mix these medications with alcohol.      If you had a SPLINT  placed in the emergency department if you have severe pain numbness tingling or discoloration of year digits please remove the splint and return to the emergency department for further evaluation as this may represent a sign of compromise to the nerves or blood vessels due to swelling.    If you had SUTURES in the emergency department please have them removed in the prescribed time frame typically within 7-14 days.  You may shower but please do not bathe or swim.  Keep the wounds clean and dry and covered with a clean dressing.  Please return if he have any signs of infection like redness or drainage or pain at the suture site.    Please take the full course of  any ANTIBIOTICS you were prescribed - incomplete courses of antibiotics can cause resistance to antibiotics in the future which will make it difficult to treat any infections you may have.

## 2023-06-09 NOTE — ED PROVIDER NOTES
Encounter Date: 2023    SCRIBE #1 NOTE: I, Jagruti Gonzales, am scribing for, and in the presence of,  Colby Rothman IV, MD. I have scribed the following portions of the note - Other sections scribed: HPI, ROS, PE.     History     Chief Complaint   Patient presents with    Leg Injury     Reports was mopping and scraped r leg - hx varicose veins and taking plavix - bleeding began to r lower lateral leg believed to be from varicose vein - pt denies large laceration, wound dressed by pt and by aasi, bleeding controled      78 year old female with history of a TIA presents to the ED with complaints of bleeding from leg. Pt reports that she has varicose veins and scraped her leg PTA and it would not stop bleeding. She notes that it was squirting blood.     The history is provided by the patient. No  was used.   Review of patient's allergies indicates:   Allergen Reactions    Adhesive tape-silicones      Past Medical History:   Diagnosis Date    Lumbar stenosis     Osteoarthritis of right knee     TIA (transient ischemic attack)      Past Surgical History:   Procedure Laterality Date    BACK SURGERY      bilateral cataracts      CHOLECYSTECTOMY      coronary stents      hip replacement - Right      Replacement of Right Knee Joint       Family History   Problem Relation Age of Onset    Lung cancer Mother             Stomach cancer Father             Heart disease Sister     Parkinsonism Brother      Social History     Tobacco Use    Smoking status: Never    Smokeless tobacco: Never   Substance Use Topics    Alcohol use: Not Currently     Comment: Ocassionally    Drug use: Never     Review of Systems   Constitutional:  Negative for chills and fever.   HENT:  Negative for congestion, rhinorrhea and sore throat.    Eyes:  Negative for visual disturbance.   Respiratory:  Negative for cough and shortness of breath.    Cardiovascular:  Negative for chest pain and leg swelling.    Gastrointestinal:  Negative for abdominal pain, nausea and vomiting.   Genitourinary:  Negative for dysuria, hematuria, vaginal bleeding and vaginal discharge.   Musculoskeletal:  Negative for joint swelling.   Skin:  Negative for rash.        Bleeding from RLE    Neurological:  Negative for weakness.   Psychiatric/Behavioral:  Negative for confusion.      Physical Exam     Initial Vitals [06/08/23 1959]   BP Pulse Resp Temp SpO2   (!) 120/48 (!) 56 16 98 °F (36.7 °C) 100 %      MAP       --         Physical Exam    Nursing note and vitals reviewed.  Constitutional: She is not diaphoretic. No distress.   Cardiovascular:  Normal rate and regular rhythm.           No murmur heard.  2+ DP and PT pulses    Pulmonary/Chest: Breath sounds normal. No respiratory distress. She has no wheezes. She has no rales.   Abdominal: Abdomen is soft. She exhibits no distension. There is no abdominal tenderness.     Neurological: She is alert.   Skin:   Varicose veins to bilateral LE. Scab to right lateral calf at site of previous bleed. No active bleeding.    Psychiatric: She has a normal mood and affect.       ED Course   Procedures  Labs Reviewed   ISTAT CHEM8 - Abnormal; Notable for the following components:       Result Value    POC Glucose 114 (*)     POC Potassium 3.4 (*)     POC Anion Gap 19 (*)     All other components within normal limits   ISTAT CHEM8          Imaging Results    None          Medications   Tdap (BOOSTRIX) vaccine injection 0.5 mL (0.5 mLs Intramuscular Given 6/8/23 2131)                Attending Attestation:           Physician Attestation for Scribe:  Physician Attestation Statement for Scribe #1: I, Colby Rothman IV, MD, reviewed documentation, as scribed by Jagruti Gonzales in my presence, and it is both accurate and complete.       Medical Decision Making  Problems Addressed:  Bleeding from varicose vein: acute illness or injury that poses a threat to life or bodily functions      ED assessment:     78-year-old on Plavix presenting for bleeding varicose vein to her right leg after she scraped against a chair  Dressing was placed at home by her  on arrival to ER bleeding is resolved 2+ distal pulses to that leg well-appearing normal vitals  POC hemoglobin 12 which is baseline per patient  Provided her with hemostatic dressing in case of emergency education given on bleeding control discharge strict return precautions given    ED management:   Tdap  Observation    Amount and/or Complexity of Data Reviewed  Independent historian: none  External data reviewed: notes from clinic visits  Summary of data reviewed: pt was seen in neurology clinic in March for followup for previous CVA   Risk and benefits of testing: discussed   Labs: ordered and reviewed    Risk  Shared decision making     Critical Care  none    I, Colby Rothman MD personally performed the history, PE, MDM, and procedures as documented above and agree with the scribe's documentation.         ED Course as of 06/08/23 2202   Thu Jun 08, 2023   2109 Will discharge at this time. Poc hb 12.9. bleeding precautions return precautions given [AC]   2110 Will provide patient with a pack of hemostatic gauze if rebleeding occurs again [AC]      ED Course User Index  [AC] Colby Rothman IV, MD                 Clinical Impression:   Final diagnoses:  [I83.899] Bleeding from varicose vein (Primary)        ED Disposition Condition    Discharge Stable          ED Prescriptions    None       Follow-up Information       Follow up With Specialties Details Why Contact Info    Primary care physician  Schedule an appointment as soon as possible for a visit   Follow up with you primary care physician.   If you do not have a primary care physician call [phone] to schedule an appointment.    Cristofer Villatoro MD Family Medicine Schedule an appointment as soon as possible for a visit  As needed [address] [facility]  [place] [address]  [phone]      [facility]  General - Emergency Dept Emergency Medicine Go to  If symptoms worsen [address] [facility] [id number]  [phone]             Colby Rothman IV, MD  06/08/23 9886

## 2023-06-19 ENCOUNTER — LAB REQUISITION (OUTPATIENT)
Dept: LAB | Facility: HOSPITAL | Age: 78
End: 2023-06-19
Payer: MEDICARE

## 2023-06-19 DIAGNOSIS — R19.7 DIARRHEA, UNSPECIFIED: ICD-10-CM

## 2023-06-19 LAB
CLOSTRIDIUM DIFFICILE GDH ANTIGEN (OHS): NEGATIVE
CLOSTRIDIUM DIFFICILE TOXIN A/B (OHS): NEGATIVE

## 2023-06-19 PROCEDURE — 87329 GIARDIA AG IA: CPT | Performed by: PHYSICIAN ASSISTANT

## 2023-06-19 PROCEDURE — 86318 IA INFECTIOUS AGENT ANTIBODY: CPT | Performed by: PHYSICIAN ASSISTANT

## 2023-06-19 PROCEDURE — 87045 FECES CULTURE AEROBIC BACT: CPT | Performed by: PHYSICIAN ASSISTANT

## 2023-06-20 LAB
CRYPTOSP AG SPEC QL: NEGATIVE
G LAMBLIA AG STL QL IA: NEGATIVE

## 2023-06-22 LAB — BACTERIA STL CULT: NORMAL

## 2023-06-26 ENCOUNTER — LAB REQUISITION (OUTPATIENT)
Dept: LAB | Facility: HOSPITAL | Age: 78
End: 2023-06-26
Payer: MEDICARE

## 2023-06-26 DIAGNOSIS — R19.7 DIARRHEA, UNSPECIFIED: ICD-10-CM

## 2023-06-26 PROCEDURE — 87209 SMEAR COMPLEX STAIN: CPT | Performed by: PHYSICIAN ASSISTANT

## 2023-06-26 PROCEDURE — 82653 EL-1 FECAL QUANTITATIVE: CPT | Performed by: PHYSICIAN ASSISTANT

## 2023-06-29 LAB — ELASTASE PANC STL-MCNT: >500 MCG/G

## 2023-07-10 LAB — O+P STL MICRO: NORMAL

## 2023-11-01 ENCOUNTER — OFFICE VISIT (OUTPATIENT)
Dept: URGENT CARE | Facility: CLINIC | Age: 78
End: 2023-11-01
Payer: MEDICARE

## 2023-11-01 ENCOUNTER — HOSPITAL ENCOUNTER (EMERGENCY)
Facility: HOSPITAL | Age: 78
Discharge: HOME | End: 2023-11-01
Attending: EMERGENCY MEDICINE
Payer: MEDICARE

## 2023-11-01 VITALS
RESPIRATION RATE: 14 BRPM | SYSTOLIC BLOOD PRESSURE: 176 MMHG | WEIGHT: 189 LBS | HEART RATE: 63 BPM | OXYGEN SATURATION: 98 % | HEIGHT: 62 IN | DIASTOLIC BLOOD PRESSURE: 72 MMHG | BODY MASS INDEX: 34.78 KG/M2 | TEMPERATURE: 99 F

## 2023-11-01 VITALS
WEIGHT: 189 LBS | SYSTOLIC BLOOD PRESSURE: 159 MMHG | BODY MASS INDEX: 34.78 KG/M2 | HEIGHT: 62 IN | DIASTOLIC BLOOD PRESSURE: 70 MMHG | OXYGEN SATURATION: 98 % | HEART RATE: 65 BPM | TEMPERATURE: 98 F | RESPIRATION RATE: 18 BRPM

## 2023-11-01 DIAGNOSIS — T14.8XXA HEMATOMA: ICD-10-CM

## 2023-11-01 DIAGNOSIS — S09.90XA INJURY OF HEAD, INITIAL ENCOUNTER: Primary | ICD-10-CM
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
## 2023-11-01 DIAGNOSIS — S09.90XA INJURY OF HEAD, INITIAL ENCOUNTER: ICD-10-CM

## 2023-11-01 PROCEDURE — 99213 PR OFFICE/OUTPT VISIT, EST, LEVL III, 20-29 MIN: ICD-10-PCS | Mod: ,,, | Performed by: FAMILY MEDICINE

## 2023-11-01 PROCEDURE — 99284 EMERGENCY DEPT VISIT MOD MDM: CPT | Mod: 25

## 2023-11-01 PROCEDURE — 25000003 PHARM REV CODE 250

## 2023-11-01 PROCEDURE — 99213 OFFICE O/P EST LOW 20 MIN: CPT | Mod: ,,, | Performed by: FAMILY MEDICINE

## 2023-11-01 RX ORDER — MUPIROCIN 20 MG/G
OINTMENT TOPICAL 3 TIMES DAILY
Qty: 22 G | Refills: 0 | Status: SHIPPED | OUTPATIENT
Start: 2023-11-01

## 2023-11-01 RX ORDER — NEOMYCIN SULFATE, POLYMYXIN B SULFATE AND DEXAMETHASONE 1; 3.5; 1 MG/ML; MG/ML; [USP'U]/ML
SUSPENSION OPHTHALMIC
COMMUNITY
Start: 2023-10-24

## 2023-11-01 RX ORDER — ACETAMINOPHEN 500 MG
1000 TABLET ORAL
Status: COMPLETED | OUTPATIENT
Start: 2023-11-01 | End: 2023-11-01

## 2023-11-01 RX ORDER — DOXAZOSIN 2 MG/1
2 TABLET ORAL
COMMUNITY
Start: 2023-10-18

## 2023-11-01 RX ORDER — CITALOPRAM 40 MG/1
40 TABLET, FILM COATED ORAL
COMMUNITY
Start: 2023-10-18

## 2023-11-01 RX ADMIN — ACETAMINOPHEN 1000 MG: 500 TABLET ORAL at 05:11

## 2023-11-01 NOTE — DISCHARGE INSTRUCTIONS
Thanks for letting us take care of you today!  It is our goal to give you courteous care and to keep you comfortable and informed, if you have any questions before you leave I will be happy to try and answer them.    Here is some advice after your visit:      Your visit in the emergency department is NOT definitive care - please follow-up with your primary care doctor and/or specialist within 1 week.  Please return if you have any worsening in your condition or if you have any other concerns.    If you had radiology exams like an XRAY or CT in the emergency Department the interpreation on them may be preliminary - there may be less time sensitive findings on the reports please obtain these reports within 24 hours from the hospital or by using your out on your mobile phone to access records.  Bring these to your primary care doctor and/or specialist for further review of incidental findings.

## 2023-11-01 NOTE — PATIENT INSTRUCTIONS
Considering history of fall x-rays today.  Bones no concerns of fracture.  Local soft tissue swelling.  Radiology final results will be monitored and reported  Considering worsening headache and feeling dizzy encouraged to go to ER directly from the clinic.  Defers ambulance today.  Desires to go to [facility] on [address] in personal vehicle.  Vital signs stable when patient left the clinic.

## 2023-11-01 NOTE — PROGRESS NOTES
"Subjective:      Patient ID: Cynthia Tiwari is a 78 y.o. female.    Vitals:  height is 5' 2" (1.575 m) and weight is 85.7 kg (189 lb). Her temperature is 99.1 °F (37.3 °C). Her blood pressure is 176/72 (abnormal) and her pulse is 63. Her respiration is 14 and oxygen saturation is 98%.     Chief Complaint: Fall (Fell around 11am after tripping, hit back of head on concrete. Denies loss of consciousness. )    HPI:  78-year-old female present to clinic with concerns of head injury.   fell in her garage on the cement after tripping over her purse.  Fell from standing height backwards on the concrete.  No loss of consciousness.   was laying in the garage for about 20-30 minutes before she helped herself up and walked into the house.  Complains of headache.  Currently on Plavix.  States Her spouse is out of town.  Accompanied by her sister today.  No concerns of chest pain, no concerns of palpitations.   was trying to leave the home for blood work, left her purse on the garage floor, after setting security alarm system was stepping backwards and tripped.  States her  will be returning today.    ROS :  Constitutional : No fever, no fatigue  Neck : Negative except HPI  HEENT : As per HPI  Respiratory : No shortness of breath, no wheezing  Cardiovascular : No chest pain  Musculoskeletal : Negative except HPI  Integumentary : No rash, no abnormal lesion  Neurological : Negative for tingling numbness and weakness   Objective:     Physical Exam  General : Alert and oriented, No apparent distress, Afebrile, appears comfortable on the exam table, talking and laughing explaining how she fell l  Neck -: supple, Non Tender  HENT :  Scalp right posterior diffuse swelling, centrally 4 cm of visible bruising, no skin breakdown.  Tender to palpate.  All other areas no concerns of bruising.  Oropharynx no redness or swelling.  Respiratory :Lungs are clear to auscultate, Breath sounds are equal  Cardiovascular : " Normal rate, normal volume pulse bilateral, systolic murmur bilateral sternal border  Musculoskeletal :  Gait appears normal.  Joints range of motion present  Integumentary : Warm, Dry   Neurologic : Alert and Oriented X 4, sensations intact, motor intact   Assessment:     1. Injury of head, initial encounter      Plan:   Considering history of fall x-rays today.  Bones no concerns of fracture.  Local soft tissue swelling.  Radiology final results will be monitored and reported  Considering worsening headache and feeling dizzy encouraged to go to ER directly from the clinic.  Defers ambulance today.  Desires to go to [facility] [address] in personal vehicle.  Vital signs stable when patient left the clinic.    Injury of head, initial encounter  -     XR SKULL COMPLETE MIN 4 VIEWS; Future; Expected date: 11/01/2023

## 2023-11-01 NOTE — ED PROVIDER NOTES
Encounter Date: 11/1/2023       History     Chief Complaint   Patient presents with    Fall     Pt states fell on today hitting head, denies loc.     78 y.o. White female with a history of TIA, OA, and hypertension presents to Emergency Department with a chief complaint of fall. Symptoms began today and have been constant since onset. Patient reports she fell while walking and hit the back of her head on concrete. Associated symptoms include hematoma to back of head. Patient on Plavix. Symptoms are aggravated with palpation and there are no alleviating factors. The patient denies CP, SOB, LOC, confusion, seizure like activity, dizziness, or vomiting. No other reported symptoms at this time      The history is provided by the patient. No  was used.   Fall  The accident occurred today. The fall occurred while walking. She landed on [place]. The volume of blood lost was minimal. The point of impact was the head. The pain is present in the head. She was Ambulatory at the scene. There was No entrapment after the fall. There was No drug use involved in the accident. There was No alcohol use involved in the accident. Associated symptoms include headaches. Pertinent negatives include no neck pain, no back pain, no paresthesias, no paralysis, no visual change, no fever, no numbness, no abdominal pain, no bowel incontinence, no nausea, no vomiting, no loss of consciousness and no tingling. The symptoms are aggravated by activity. She has tried nothing for the symptoms.     Review of patient's allergies indicates:   Allergen Reactions    Adhesive tape-silicones      Past Medical History:   Diagnosis Date    Lumbar stenosis     Osteoarthritis of right knee     TIA (transient ischemic attack)      Past Surgical History:   Procedure Laterality Date    BACK SURGERY      bilateral cataracts      CHOLECYSTECTOMY      coronary stents      hip replacement - Right      Replacement of Right Knee Joint       Family  History   Problem Relation Age of Onset    Lung cancer Mother             Stomach cancer Father             Heart disease Sister     Parkinsonism Brother      Social History     Tobacco Use    Smoking status: Never    Smokeless tobacco: Never   Substance Use Topics    Alcohol use: Not Currently     Comment: Ocassionally    Drug use: Never     Review of Systems   Constitutional:  Negative for chills, fatigue and fever.   Eyes:  Negative for photophobia and visual disturbance.   Respiratory:  Negative for cough, shortness of breath, wheezing and stridor.    Cardiovascular:  Negative for chest pain and palpitations.   Gastrointestinal:  Negative for abdominal pain, bowel incontinence, nausea and vomiting.   Musculoskeletal:  Negative for back pain and neck pain.   Skin:  Positive for color change and wound.   Neurological:  Positive for headaches. Negative for dizziness, tingling, loss of consciousness, syncope, weakness, numbness and paresthesias.   All other systems reviewed and are negative.      Physical Exam     Initial Vitals [23 1435]   BP Pulse Resp Temp SpO2   (!) 177/93 65 20 98.2 °F (36.8 °C) 97 %      MAP       --         Physical Exam    Nursing note and vitals reviewed.  Constitutional: She appears well-developed and well-nourished. She is not diaphoretic. She is cooperative.  Non-toxic appearance. No distress.   HENT:   Head: Normocephalic. Head is with abrasion and with contusion.       Right Ear: External ear normal.   Left Ear: External ear normal.   Nose: Nose normal.   Mouth/Throat: Oropharynx is clear and moist.   Eyes: Conjunctivae and EOM are normal. Pupils are equal, round, and reactive to light.   Neck: Neck supple.   Normal range of motion.   Full passive range of motion without pain.     Cardiovascular:  Normal rate, regular rhythm, S1 normal, S2 normal, normal heart sounds, intact distal pulses and normal pulses.           Pulmonary/Chest: Effort normal and breath  sounds normal. No tachypnea and no bradypnea. No respiratory distress. She has no wheezes. She exhibits no tenderness.   Abdominal: Abdomen is soft. Bowel sounds are normal. She exhibits no distension. There is no abdominal tenderness. There is no guarding.   Musculoskeletal:         General: Normal range of motion.      Cervical back: Full passive range of motion without pain, normal range of motion and neck supple.     Neurological: She is alert and oriented to person, place, and time. She has normal strength. No sensory deficit. GCS score is 15. GCS eye subscore is 4. GCS verbal subscore is 5. GCS motor subscore is 6.   Skin: Skin is warm and dry. Capillary refill takes less than 2 seconds. Abrasion noted. There is erythema.   Psychiatric: She has a normal mood and affect. Thought content normal.         ED Course   Procedures  Labs Reviewed - No data to display       Imaging Results              CT Cervical Spine Without Contrast (Final result)  Result time 11/01/23 16:49:26      Final result by Filemon Fisher MD (11/01/23 16:49:26)                   Impression:      No acute fracture or malalignment identified.      Electronically signed by: Filemon Fisher  Date:    11/01/2023  Time:    16:49               Narrative:    EXAMINATION:  CT CERVICAL SPINE WITHOUT CONTRAST    CLINICAL HISTORY:  Trauma.    TECHNIQUE:  Multidetector axial images were performed of the cervical spine without and.  Images were reconstructed.    Automated exposure control was utilized to minimize radiation dose.  DLP 1043.    COMPARISON:  None available.    FINDINGS:  Cervical vertebrae stature is maintained and alignment is unremarkable.  No acute fracture or malalignment identified.  There are multilevel degenerative changes causing ventral impression upon the thecal sac and narrowings of the neural foramen.  There is no prevertebral soft tissue prominence.    This study does not exclude the possibility of intrathecal soft tissue,  ligamentous or vascular injury.                                       CT Head Without Contrast (Final result)  Result time 11/01/23 16:45:16      Final result by Demian Bull MD (11/01/23 16:45:16)                   Narrative:    EXAMINATION  CT HEAD WITHOUT CONTRAST    CLINICAL HISTORY  Head trauma, minor (Age >= 65y);    TECHNIQUE  Axial non-contrast CT images of the head were acquired and multiplanar reconstructions accomplished by a CT technologist at a separate workstation, pushed to PACS for physician review.    COMPARISON  17 March 2017    FINDINGS  Images were reviewed in subdural, brain, soft tissue, and bone windows.    Exam quality: Motion/streak artifact limits assessment of the posterior fossa.    Hemorrhage: No evidence of acute hyperattenuating blood products.    Parenchyma: There is diffuse bilateral supratentorial white matter hypoattenuation, typical of chronic microvascular changes. No discrete mass, mass effect, or CT evidence of acute large vascular territory insult. Gray-white differentiation is preserved.    Midline shift: None.    CSF spaces: Proportional appearance of ventricular and sulcal enlargement. No hydrocephalus. No masses or fluid collections.    Vasculature: No focally hyperdense artery. Scattered carotid siphon calcifications are present. No abnormal densities within the dural sinuses.    Other findings: Posterior scalp hematoma is present, with the remaining extracranial soft tissues unremarkable.  No depressed skull fracture appreciated.  Mastoids are well aerated. No focal abnormality of the sella. The included facial structures are unremarkable.    IMPRESSION  1. No convincing CT evidence of acute intracranial abnormality.  2. Posterior scalp hematoma without depressed skull fracture.  3. Chronic age-related secondary details discussed above.    RADIATION DOSE  Automated tube current modulation, weight-based exposure dosing, and/or iterative reconstruction technique  utilized to reach lowest reasonably achievable exposure rate.    DLP: 1043 mGy*cm      Electronically signed by: Demian Bull  Date:    11/01/2023  Time:    16:45                                     Medications   acetaminophen tablet 1,000 mg (1,000 mg Oral Given 11/1/23 1731)     Medical Decision Making  Amount and/or Complexity of Data Reviewed  Radiology: ordered.    Risk  OTC drugs.  Prescription drug management.                          Medical Decision Making:   Initial Assessment:   Patient awake, alert, has non-labored breathing, and follows commands appropriately. C/o headache after fall on today. On Plavix. Denies LOC. GCS 15. NAD noted. Afebrile.    Differential Diagnosis:   Head Injury, Hematoma, Headache, Fall   Clinical Tests:   Radiological Study: Ordered and Reviewed  ED Management:  Co-morbidities and/or factors adding to the complexity or risk for the patient?: none  Differential diagnoses: Head Injury, Hematoma, Headache, Fall   Decision to obtain previous or outside records?: I did not review records.   Chart Review (nursing home, outside records, CareEverywhere): none  Review of RX medications/new RX prescribed by me?: Prescribed Bactroban.   Labs/imaging/other tests obtained/considered (risk/benefits of testing discussed): imaging  Labs/tests intepretation: CT head- 1. No convincing CT evidence of acute intracranial abnormality. 2. Posterior scalp hematoma without depressed skull fracture. 3. Chronic age-related secondary details discussed above. CT cervical spine- No acute fracture or malalignment identified. Informed patient of results.   My independent imaging interpretation: none  Treatment/interventions, IV fluids, IV medications: Tylenol given in ER.   Complex management (IV controlled substances, went to OR, DNR, meds requiring monitoring, transfer, etc)?: none  Workup/treatment affected by social determinants of health?:none   Point of care US done/interpretation:  none  Consults/radiologist/EMS/social work/family discussion/alternate history: none  Advanced care planning/end of life discussion: none  Shared decision making: Discussed plan of care and interventions with patient. Cleansed area to back of head, no laceration or avulsion noted; no need for repair. Strict ER return precautions such as confusion, seizures, syncope, lethargy, etc given to patient and spouse. Agreed to and aware of plan of care. Comfortable being discharged home.   ETOH/smoking/drug cessation discussion: none  Dispo: Patient discharged home. Patient denies new or additional complaints; no further tests indicated at this time. Verbalized understanding of instructions. No emergent or apparent distress noted prior to discharge. To follow up with PCP in 1 week as needed. Strict ER return precautions given.       Other:   I discussed test(s) with the performing physician.       <> Summary of the Findings: Discussed patient and results with Dr. Cohn who agreed with plan of care to dispo home with strict ER return precautions. NO further instruction or recommendations given.       Clinical Impression:   Final diagnoses:  [W19.XXXA] Fall, initial encounter (Primary)  [T14.8XXA] Hematoma  [S09.90XA] Injury of head, initial encounter        ED Disposition Condition    Discharge Stable          ED Prescriptions       Medication Sig Dispense Start Date End Date Auth. Provider    mupirocin (BACTROBAN) 2 % ointment Apply topically 3 (three) times daily. 22 g 11/1/2023 -- Isa Andrade, NP          Follow-up Information       Follow up With Specialties Details Why Contact Info    Cristofer Villatoro MD Family Medicine Call in 1 week If symptoms worsen, As needed [address] [facility] [id number]  [phone]      [facility] - Emergency Dept Emergency Medicine Go to  If symptoms worsen, As needed [address]  [facility] [id number] [phone]              Isa Andrade NP  11/01/23 [id number]       Isa Andrade, NP  11/01/23 6540

## 2025-03-03 PROCEDURE — 87186 SC STD MICRODIL/AGAR DIL: CPT | Mod: 91 | Performed by: NURSE PRACTITIONER

## 2025-07-17 DIAGNOSIS — M54.40 LUMBAGO WITH SCIATICA: Primary | ICD-10-CM

## 2025-07-17 DIAGNOSIS — M48.061 SPINAL STENOSIS, LUMBAR REGION, WITHOUT NEUROGENIC CLAUDICATION: ICD-10-CM

## 2025-08-18 ENCOUNTER — OFFICE VISIT (OUTPATIENT)
Facility: CLINIC | Age: 80
End: 2025-08-18
Payer: MEDICARE

## 2025-08-18 VITALS
BODY MASS INDEX: 33.79 KG/M2 | HEIGHT: 62 IN | OXYGEN SATURATION: 98 % | HEART RATE: 57 BPM | DIASTOLIC BLOOD PRESSURE: 66 MMHG | SYSTOLIC BLOOD PRESSURE: 125 MMHG | WEIGHT: 183.63 LBS

## 2025-08-18 DIAGNOSIS — Z78.0 ASYMPTOMATIC MENOPAUSAL STATE: ICD-10-CM

## 2025-08-18 DIAGNOSIS — M48.061 SPINAL STENOSIS OF LUMBAR REGION WITHOUT NEUROGENIC CLAUDICATION: ICD-10-CM

## 2025-08-18 DIAGNOSIS — M54.40 BACK PAIN OF LUMBAR REGION WITH SCIATICA: Primary | ICD-10-CM

## 2025-08-18 PROCEDURE — 99214 OFFICE O/P EST MOD 30 MIN: CPT | Mod: ,,, | Performed by: NURSE PRACTITIONER
